# Patient Record
Sex: MALE | Race: BLACK OR AFRICAN AMERICAN | NOT HISPANIC OR LATINO | Employment: FULL TIME | URBAN - METROPOLITAN AREA
[De-identification: names, ages, dates, MRNs, and addresses within clinical notes are randomized per-mention and may not be internally consistent; named-entity substitution may affect disease eponyms.]

---

## 2017-02-08 ENCOUNTER — TELEPHONE (OUTPATIENT)
Dept: INTERNAL MEDICINE | Facility: CLINIC | Age: 52
End: 2017-02-08

## 2017-02-08 NOTE — TELEPHONE ENCOUNTER
----- Message from Zully Green sent at 2017  2:34 PM EST -----  Contact: pt - Dr lacy' pt - RE: new Rx refill  Pt calling and would like a refill on Rx      Stendra 100 mg      Si(one) tab 30 min before sex.  #6 30 days    [facility] - [address]  # [phone]        Pt # [phone]

## 2017-02-09 DIAGNOSIS — N52.8 OTHER MALE ERECTILE DYSFUNCTION: Primary | ICD-10-CM

## 2017-03-01 RX ORDER — LISINOPRIL AND HYDROCHLOROTHIAZIDE 20; 12.5 MG/1; MG/1
1 TABLET ORAL DAILY
Qty: 90 TABLET | Refills: 3 | Status: SHIPPED | OUTPATIENT
Start: 2017-03-01 | End: 2018-02-22 | Stop reason: SDUPTHER

## 2017-03-01 NOTE — TELEPHONE ENCOUNTER
----- Message from Nicky Jiang sent at 3/1/2017 12:18 PM EST -----  Contact: pt  Pt calling and would like a refill on RX sent into Pharmacy. Please advise.     Lisinopril-Hydroclorothiazide 20-12.5 mg, 90 days     Send to :  [facility] [address] [place] [address] [id number] AT [facility] [address] - [phone]  - [phone] FX    Pt# [phone]

## 2018-02-22 DIAGNOSIS — N52.8 OTHER MALE ERECTILE DYSFUNCTION: Primary | ICD-10-CM

## 2018-02-22 RX ORDER — LISINOPRIL AND HYDROCHLOROTHIAZIDE 20; 12.5 MG/1; MG/1
1 TABLET ORAL DAILY
Qty: 30 TABLET | Refills: 0 | Status: SHIPPED | OUTPATIENT
Start: 2018-02-22 | End: 2018-04-05 | Stop reason: SDUPTHER

## 2018-02-26 DIAGNOSIS — N52.8 OTHER MALE ERECTILE DYSFUNCTION: ICD-10-CM

## 2018-02-26 RX ORDER — AVANAFIL 100 MG/1
TABLET ORAL
Qty: 10 TABLET | Refills: 0 | Status: SHIPPED | OUTPATIENT
Start: 2018-02-26 | End: 2018-06-28 | Stop reason: SDUPTHER

## 2018-04-05 DIAGNOSIS — N52.8 OTHER MALE ERECTILE DYSFUNCTION: ICD-10-CM

## 2018-04-05 RX ORDER — LISINOPRIL AND HYDROCHLOROTHIAZIDE 20; 12.5 MG/1; MG/1
1 TABLET ORAL DAILY
Qty: 30 TABLET | Refills: 0 | Status: SHIPPED | OUTPATIENT
Start: 2018-04-05 | End: 2018-04-09 | Stop reason: SDUPTHER

## 2018-04-09 ENCOUNTER — OFFICE VISIT (OUTPATIENT)
Dept: INTERNAL MEDICINE | Facility: CLINIC | Age: 53
End: 2018-04-09

## 2018-04-09 VITALS
BODY MASS INDEX: 27.13 KG/M2 | HEIGHT: 68 IN | DIASTOLIC BLOOD PRESSURE: 100 MMHG | SYSTOLIC BLOOD PRESSURE: 142 MMHG | WEIGHT: 179 LBS

## 2018-04-09 DIAGNOSIS — I10 ESSENTIAL HYPERTENSION: ICD-10-CM

## 2018-04-09 DIAGNOSIS — Z12.5 PROSTATE CANCER SCREENING: Primary | ICD-10-CM

## 2018-04-09 DIAGNOSIS — Z00.00 ANNUAL PHYSICAL EXAM: ICD-10-CM

## 2018-04-09 LAB
ALBUMIN SERPL-MCNC: 4.1 G/DL (ref 3.5–5.2)
ALBUMIN/GLOB SERPL: 1.2 G/DL
ALP SERPL-CCNC: 75 U/L (ref 39–117)
ALT SERPL W P-5'-P-CCNC: 21 U/L (ref 1–41)
ANION GAP SERPL CALCULATED.3IONS-SCNC: 11.2 MMOL/L
AST SERPL-CCNC: 21 U/L (ref 1–40)
BASOPHILS # BLD AUTO: 0.02 10*3/MM3 (ref 0–0.2)
BASOPHILS NFR BLD AUTO: 0.3 % (ref 0–2)
BILIRUB SERPL-MCNC: 0.7 MG/DL (ref 0.1–1.2)
BUN BLD-MCNC: 19 MG/DL (ref 6–20)
BUN/CREAT SERPL: 14.7 (ref 7–25)
CALCIUM SPEC-SCNC: 9.5 MG/DL (ref 8.6–10.5)
CHLORIDE SERPL-SCNC: 99 MMOL/L (ref 98–107)
CHOLEST SERPL-MCNC: 196 MG/DL (ref 0–200)
CO2 SERPL-SCNC: 29.8 MMOL/L (ref 22–29)
CREAT BLD-MCNC: 1.29 MG/DL (ref 0.76–1.27)
DEPRECATED RDW RBC AUTO: 41.6 FL (ref 37–54)
EOSINOPHIL # BLD AUTO: 0.1 10*3/MM3 (ref 0–0.7)
EOSINOPHIL NFR BLD AUTO: 1.6 % (ref 0–5)
ERYTHROCYTE [DISTWIDTH] IN BLOOD BY AUTOMATED COUNT: 13.4 % (ref 11.5–15)
GFR SERPL CREATININE-BSD FRML MDRD: 58 ML/MIN/1.73
GLOBULIN UR ELPH-MCNC: 3.4 GM/DL
GLUCOSE BLD-MCNC: 98 MG/DL (ref 65–99)
HCT VFR BLD AUTO: 44.6 % (ref 40.1–51)
HDLC SERPL-MCNC: 55 MG/DL (ref 40–60)
HGB BLD-MCNC: 15.7 G/DL (ref 13.7–17.5)
LDLC SERPL CALC-MCNC: 121 MG/DL (ref 0–100)
LDLC/HDLC SERPL: 2.19 {RATIO}
LYMPHOCYTES # BLD AUTO: 2.36 10*3/MM3 (ref 0.8–7)
LYMPHOCYTES NFR BLD AUTO: 37.9 % (ref 10–60)
MCH RBC QN AUTO: 30.7 PG (ref 26–34)
MCHC RBC AUTO-ENTMCNC: 35.2 G/DL (ref 31–37)
MCV RBC AUTO: 87.1 FL (ref 80–100)
MONOCYTES # BLD AUTO: 0.77 10*3/MM3 (ref 0–1)
MONOCYTES NFR BLD AUTO: 12.4 % (ref 0–13)
NEUTROPHILS # BLD AUTO: 2.97 10*3/MM3 (ref 1–11)
NEUTROPHILS NFR BLD AUTO: 47.8 % (ref 30–85)
PLATELET # BLD AUTO: 227 10*3/MM3 (ref 150–450)
PMV BLD AUTO: 10.3 FL (ref 6–12)
POTASSIUM BLD-SCNC: 3.9 MMOL/L (ref 3.5–5.2)
PROT SERPL-MCNC: 7.5 G/DL (ref 6–8.5)
PSA SERPL-MCNC: 4.71 NG/ML (ref 0–4)
RBC # BLD AUTO: 5.12 10*6/MM3 (ref 4.63–6.08)
SODIUM BLD-SCNC: 140 MMOL/L (ref 136–145)
TRIGL SERPL-MCNC: 102 MG/DL (ref 0–150)
VLDLC SERPL-MCNC: 20.4 MG/DL (ref 5–40)
WBC NRBC COR # BLD: 6.22 10*3/MM3 (ref 5–10)

## 2018-04-09 PROCEDURE — 80053 COMPREHEN METABOLIC PANEL: CPT | Performed by: INTERNAL MEDICINE

## 2018-04-09 PROCEDURE — 80061 LIPID PANEL: CPT | Performed by: INTERNAL MEDICINE

## 2018-04-09 PROCEDURE — 99396 PREV VISIT EST AGE 40-64: CPT | Performed by: INTERNAL MEDICINE

## 2018-04-09 PROCEDURE — 85025 COMPLETE CBC W/AUTO DIFF WBC: CPT | Performed by: INTERNAL MEDICINE

## 2018-04-09 RX ORDER — LISINOPRIL 2.5 MG/1
2.5 TABLET ORAL
COMMUNITY
End: 2019-05-09

## 2018-04-09 NOTE — PROGRESS NOTES
Subjective   Aldair Wesley is a 53 y.o. male.     Hypertension   This is a chronic problem. The current episode started more than 1 year ago. Pertinent negatives include no chest pain or palpitations.        The following portions of the patient's history were reviewed and updated as appropriate: allergies, current medications, past family history, past medical history, past social history, past surgical history and problem list.    Review of Systems   Constitutional:        Here for CPE Former pt of Dr Benjamin   HENT: Negative.    Eyes: Negative.    Respiratory: Negative.    Cardiovascular: Negative for chest pain and palpitations.        BP well controlled on lisinopril   Gastrointestinal: Negative.         Due for Cscope   Genitourinary: Negative.    Musculoskeletal: Negative.    Neurological: Negative.    Psychiatric/Behavioral: Negative.        Objective   Physical Exam   Constitutional: He is oriented to person, place, and time. He appears well-developed and well-nourished.   HENT:   Head: Normocephalic.   Right Ear: External ear normal.   Left Ear: External ear normal.   Nose: Nose normal.   Mouth/Throat: Oropharynx is clear and moist.   Eyes: Conjunctivae and EOM are normal. Pupils are equal, round, and reactive to light.   Neck: Normal range of motion. Neck supple.   Cardiovascular: Normal rate, regular rhythm, normal heart sounds and intact distal pulses.    Repeat 140/90   Pulmonary/Chest: Effort normal and breath sounds normal.   Abdominal: Soft. Bowel sounds are normal. He exhibits no mass. There is no tenderness.   Genitourinary: Rectum normal, prostate normal and penis normal.   Musculoskeletal: Normal range of motion.   Lymphadenopathy:     He has no cervical adenopathy.   Neurological: He is alert and oriented to person, place, and time. He has normal reflexes.   Skin: Skin is warm and dry.   Psychiatric: He has a normal mood and affect. His behavior is normal.       Assessment/Plan   Aldair was  seen today for annual exam.    Diagnoses and all orders for this visit:    Prostate cancer screening  -     PSA SCREENING; Future  -     PSA SCREENING    Annual physical exam  -     CBC Auto Differential; Future  -     Comprehensive Metabolic Panel; Future  -     CBC Auto Differential  -     Comprehensive Metabolic Panel    Essential hypertension  -     Lipid Panel; Future  -     XR Chest PA & Lateral; Future  -     Lipid Panel

## 2018-05-22 ENCOUNTER — TELEPHONE (OUTPATIENT)
Dept: INTERNAL MEDICINE | Facility: CLINIC | Age: 53
End: 2018-05-22

## 2018-05-22 NOTE — TELEPHONE ENCOUNTER
----- Message from Zully Green sent at 5/22/2018  2:46 PM EDT -----  Contact: pt - Dr Mercado's pt - RE: Labs results  Pt calling and would like a call back regarding lab results as pt received his labs in the mail stating :    PSA is slightly elevated.  Other labs are good.  Please call to discuss    Pt # [phone]

## 2018-05-22 NOTE — TELEPHONE ENCOUNTER
Spoke with patient.  I will repeat PSA and rectal exam in about 3 months and schedule him for colonoscopy at that time as well.

## 2018-05-29 PROCEDURE — 71046 X-RAY EXAM CHEST 2 VIEWS: CPT | Performed by: RADIOLOGY

## 2018-06-28 DIAGNOSIS — N52.8 OTHER MALE ERECTILE DYSFUNCTION: ICD-10-CM

## 2018-06-28 RX ORDER — AVANAFIL 100 MG/1
TABLET ORAL
Qty: 10 TABLET | Refills: 0 | Status: SHIPPED | OUTPATIENT
Start: 2018-06-28 | End: 2021-05-28

## 2018-08-18 DIAGNOSIS — N52.8 OTHER MALE ERECTILE DYSFUNCTION: ICD-10-CM

## 2018-08-20 RX ORDER — LISINOPRIL AND HYDROCHLOROTHIAZIDE 20; 12.5 MG/1; MG/1
1 TABLET ORAL DAILY
Qty: 30 TABLET | Refills: 0 | Status: SHIPPED | OUTPATIENT
Start: 2018-08-20 | End: 2019-05-09

## 2018-08-26 DIAGNOSIS — N52.8 OTHER MALE ERECTILE DYSFUNCTION: ICD-10-CM

## 2018-08-27 RX ORDER — LISINOPRIL AND HYDROCHLOROTHIAZIDE 20; 12.5 MG/1; MG/1
1 TABLET ORAL DAILY
Qty: 30 TABLET | Refills: 3 | Status: SHIPPED | OUTPATIENT
Start: 2018-08-27 | End: 2019-05-09

## 2019-04-30 ENCOUNTER — TELEPHONE (OUTPATIENT)
Dept: INTERNAL MEDICINE | Facility: CLINIC | Age: 54
End: 2019-04-30

## 2019-04-30 NOTE — TELEPHONE ENCOUNTER
----- Message from Keri Haley sent at 4/29/2019  3:45 PM EDT -----  Contact: Pt   Pt is scheduled to see Donna on 5-9-2019. He is requesting an order for his first screening colonoscopy. He stated he does not have a specific doctor in mind. Please advise. Thank you.

## 2019-04-30 NOTE — TELEPHONE ENCOUNTER
Pt informed that we will discuss that more in depth when we see him her on 05/09. He agreed and understood.

## 2019-05-09 ENCOUNTER — OFFICE VISIT (OUTPATIENT)
Dept: INTERNAL MEDICINE | Facility: CLINIC | Age: 54
End: 2019-05-09

## 2019-05-09 VITALS
OXYGEN SATURATION: 97 % | BODY MASS INDEX: 28.25 KG/M2 | HEART RATE: 69 BPM | WEIGHT: 180 LBS | SYSTOLIC BLOOD PRESSURE: 118 MMHG | HEIGHT: 67 IN | DIASTOLIC BLOOD PRESSURE: 78 MMHG

## 2019-05-09 DIAGNOSIS — M72.2 PLANTAR FASCIITIS, BILATERAL: ICD-10-CM

## 2019-05-09 DIAGNOSIS — I10 ESSENTIAL HYPERTENSION: ICD-10-CM

## 2019-05-09 DIAGNOSIS — Z00.00 ANNUAL PHYSICAL EXAM: Primary | ICD-10-CM

## 2019-05-09 DIAGNOSIS — Z12.11 COLON CANCER SCREENING: ICD-10-CM

## 2019-05-09 DIAGNOSIS — Z11.59 SCREENING FOR VIRAL DISEASE: ICD-10-CM

## 2019-05-09 DIAGNOSIS — Z12.5 PROSTATE CANCER SCREENING: ICD-10-CM

## 2019-05-09 LAB
ALBUMIN SERPL-MCNC: 4.3 G/DL (ref 3.5–5.2)
ALBUMIN/GLOB SERPL: 1.3 G/DL
ALP SERPL-CCNC: 71 U/L (ref 39–117)
ALT SERPL W P-5'-P-CCNC: 17 U/L (ref 1–41)
ANION GAP SERPL CALCULATED.3IONS-SCNC: 11.9 MMOL/L
AST SERPL-CCNC: 18 U/L (ref 1–40)
BILIRUB SERPL-MCNC: 0.6 MG/DL (ref 0.2–1.2)
BUN BLD-MCNC: 16 MG/DL (ref 6–20)
BUN/CREAT SERPL: 12.5 (ref 7–25)
CALCIUM SPEC-SCNC: 9.3 MG/DL (ref 8.6–10.5)
CHLORIDE SERPL-SCNC: 101 MMOL/L (ref 98–107)
CHOLEST SERPL-MCNC: 189 MG/DL (ref 0–200)
CO2 SERPL-SCNC: 26.1 MMOL/L (ref 22–29)
CREAT BLD-MCNC: 1.28 MG/DL (ref 0.76–1.27)
DEPRECATED RDW RBC AUTO: 42.2 FL (ref 37–54)
ERYTHROCYTE [DISTWIDTH] IN BLOOD BY AUTOMATED COUNT: 13.4 % (ref 12.3–15.4)
GFR SERPL CREATININE-BSD FRML MDRD: 59 ML/MIN/1.73
GLOBULIN UR ELPH-MCNC: 3.3 GM/DL
GLUCOSE BLD-MCNC: 97 MG/DL (ref 65–99)
HCT VFR BLD AUTO: 44.5 % (ref 37.5–51)
HDLC SERPL-MCNC: 47 MG/DL (ref 40–60)
HGB BLD-MCNC: 15.5 G/DL (ref 13–17.7)
LDLC SERPL CALC-MCNC: 128 MG/DL (ref 0–100)
LDLC/HDLC SERPL: 2.73 {RATIO}
MCH RBC QN AUTO: 30.8 PG (ref 26.6–33)
MCHC RBC AUTO-ENTMCNC: 34.8 G/DL (ref 31.5–35.7)
MCV RBC AUTO: 88.3 FL (ref 79–97)
PLATELET # BLD AUTO: 250 10*3/MM3 (ref 140–450)
PMV BLD AUTO: 10.4 FL (ref 6–12)
POTASSIUM BLD-SCNC: 4.3 MMOL/L (ref 3.5–5.2)
PROT SERPL-MCNC: 7.6 G/DL (ref 6–8.5)
PSA SERPL-MCNC: 4.79 NG/ML (ref 0–4)
RBC # BLD AUTO: 5.04 10*6/MM3 (ref 4.14–5.8)
SODIUM BLD-SCNC: 139 MMOL/L (ref 136–145)
TRIGL SERPL-MCNC: 69 MG/DL (ref 0–150)
VLDLC SERPL-MCNC: 13.8 MG/DL (ref 5–40)
WBC NRBC COR # BLD: 5.72 10*3/MM3 (ref 3.4–10.8)

## 2019-05-09 PROCEDURE — 80053 COMPREHEN METABOLIC PANEL: CPT | Performed by: NURSE PRACTITIONER

## 2019-05-09 PROCEDURE — 99396 PREV VISIT EST AGE 40-64: CPT | Performed by: NURSE PRACTITIONER

## 2019-05-09 PROCEDURE — 80061 LIPID PANEL: CPT | Performed by: NURSE PRACTITIONER

## 2019-05-09 PROCEDURE — 85027 COMPLETE CBC AUTOMATED: CPT | Performed by: NURSE PRACTITIONER

## 2019-05-09 RX ORDER — LISINOPRIL 2.5 MG/1
2.5 TABLET ORAL DAILY
Qty: 30 TABLET | Refills: 5 | Status: SHIPPED | OUTPATIENT
Start: 2019-05-09 | End: 2019-06-13 | Stop reason: SDUPTHER

## 2019-05-09 RX ORDER — LISINOPRIL 2.5 MG/1
2.5 TABLET ORAL DAILY
COMMUNITY
End: 2019-05-09 | Stop reason: SDUPTHER

## 2019-05-09 NOTE — PROGRESS NOTES
Subjective   Aldair Wesley is a 54 y.o. male.     .Well Adult Physical   Patient here for a comprehensive physical exam.The patient reports no problems    Do you take any herbs or supplements that were not prescribed by a doctor? no   Are you taking calcium supplements? no   Are you taking aspirin daily? no     History:  Patient receives prostate care outside our clinic  Date last prostate exam: 4/2018  Date last PSA: 4/2018    He works full time (). He is exercising maybe twice week. He eats good diet. He is up to date with eye exam .          The following portions of the patient's history were reviewed and updated as appropriate: allergies, current medications, past family history, past medical history, past social history, past surgical history and problem list.    Review of Systems   Constitutional: Negative for activity change, appetite change, chills, fatigue, fever and unexpected weight change.        Overall doing well    HENT: Negative for congestion, ear discharge, ear pain, hearing loss, mouth sores, nosebleeds, postnasal drip, rhinorrhea, sinus pressure, sneezing, sore throat, tinnitus and voice change.    Eyes: Negative for visual disturbance.   Respiratory: Negative for cough, chest tightness, shortness of breath and wheezing.    Cardiovascular: Positive for palpitations (went to [facility] about one month ago and ECG ok. no other problems ). Negative for chest pain and leg swelling.   Gastrointestinal: Negative for abdominal distention, abdominal pain, anal bleeding, blood in stool, constipation, diarrhea, nausea and vomiting.   Endocrine: Negative for cold intolerance, heat intolerance, polydipsia, polyphagia and polyuria.   Genitourinary: Negative for difficulty urinating, discharge, frequency, hematuria, penile pain, penile swelling, scrotal swelling, testicular pain and urgency.   Musculoskeletal: Negative for arthralgias, back pain, gait problem, joint swelling, myalgias, neck pain and  neck stiffness.        Bilateral foot pain, worst right foot, started few months ago, aching, worst with standing   Skin: Negative for color change, pallor and rash.        NEGATIVE BREAST MASS, BREAST PAIN, NIPPLE DISCHARGE, SKIN CHANGES   Neurological: Negative for dizziness, tremors, seizures, syncope, speech difficulty, weakness, light-headedness, numbness and headaches.   Hematological: Negative for adenopathy. Does not bruise/bleed easily.   Psychiatric/Behavioral: Negative for confusion, decreased concentration, dysphoric mood, sleep disturbance and suicidal ideas. The patient is not nervous/anxious.        Objective   Physical Exam   Constitutional: He is oriented to person, place, and time. He appears well-developed and well-nourished. No distress.   HENT:   Head: Normocephalic and atraumatic.   Right Ear: External ear normal.   Left Ear: External ear normal.   Nose: Nose normal.   Mouth/Throat: Oropharynx is clear and moist.   Eyes: Conjunctivae and EOM are normal. Pupils are equal, round, and reactive to light. Right eye exhibits no discharge. Left eye exhibits no discharge. No scleral icterus.   Neck: Normal range of motion. Neck supple. No JVD present. Carotid bruit is not present. No tracheal deviation present. No thyroid mass and no thyromegaly present.   Cardiovascular: Normal rate, regular rhythm, normal heart sounds and intact distal pulses. Exam reveals no gallop and no friction rub.   No murmur heard.  Repeat bp left arm 110/78  No pedal edema    Pulmonary/Chest: Effort normal and breath sounds normal. No respiratory distress. He has no wheezes. He has no rales. He exhibits no tenderness.   Abdominal: Soft. Bowel sounds are normal. He exhibits no distension and no mass. There is no tenderness. There is no rebound and no guarding. No hernia.   Genitourinary:   Genitourinary Comments: He decline hernia/prostate/rectal exam    Musculoskeletal: Normal range of motion. He exhibits no edema.   (-) SLR     Lymphadenopathy:        Head (right side): No submental, no submandibular, no tonsillar, no preauricular, no posterior auricular and no occipital adenopathy present.        Head (left side): No submental, no submandibular, no tonsillar, no preauricular, no posterior auricular and no occipital adenopathy present.     He has no cervical adenopathy.   Neurological: He is alert and oriented to person, place, and time. He has normal reflexes. No cranial nerve deficit. He exhibits normal muscle tone. Coordination normal.   Reflex Scores:       Brachioradialis reflexes are 2+ on the right side and 2+ on the left side.       Patellar reflexes are 2+ on the right side and 2+ on the left side.  Skin: Skin is warm and dry. No rash noted. No erythema.   Psychiatric: He has a normal mood and affect. His speech is normal and behavior is normal. Judgment and thought content normal. Cognition and memory are normal.   Vitals reviewed.      Assessment/Plan   Aldair was seen today for annual exam.    Diagnoses and all orders for this visit:    Annual physical exam  -     Comprehensive Metabolic Panel; Future  -     Lipid Panel; Future  -     CBC No Differential; Future  -     TSH Rfx On Abnormal To Free T4; Future  -     Comprehensive Metabolic Panel  -     Lipid Panel  -     CBC No Differential  -     TSH Rfx On Abnormal To Free T4    Essential hypertension  -     lisinopril (PRINIVIL,ZESTRIL) 2.5 MG tablet; Take 1 tablet by mouth Daily.    Prostate cancer screening  -     PSA SCREENING; Future  -     PSA SCREENING    Screening for viral disease  -     Hepatitis C Antibody; Future  -     Hepatitis C Antibody    Plantar fasciitis, bilateral  Comments:  wear supportive shoes     Colon cancer screening  -     Ambulatory Referral to General Surgery        This patient is new to me previous patient of Dr Mercado.   He declined TDAP today.   He was advised to shingrix  (check with insurance regarding coverage).

## 2019-05-09 NOTE — PATIENT INSTRUCTIONS
Health Maintenance, Male  A healthy lifestyle and preventive care is important for your health and wellness. Ask your health care provider about what schedule of regular examinations is right for you.  What should I know about weight and diet?  Eat a Healthy Diet  · Eat plenty of vegetables, fruits, whole grains, low-fat dairy products, and lean protein.  · Do not eat a lot of foods high in solid fats, added sugars, or salt.    Maintain a Healthy Weight  Regular exercise can help you achieve or maintain a healthy weight. You should:  · Do at least 150 minutes of exercise each week. The exercise should increase your heart rate and make you sweat (moderate-intensity exercise).  · Do strength-training exercises at least twice a week.    Watch Your Levels of Cholesterol and Blood Lipids  · Have your blood tested for lipids and cholesterol every 5 years starting at 35 years of age. If you are at high risk for heart disease, you should start having your blood tested when you are 20 years old. You may need to have your cholesterol levels checked more often if:  ? Your lipid or cholesterol levels are high.  ? You are older than 50 years of age.  ? You are at high risk for heart disease.    What should I know about cancer screening?  Many types of cancers can be detected early and may often be prevented.  Lung Cancer  · You should be screened every year for lung cancer if:  ? You are a current smoker who has smoked for at least 30 years.  ? You are a former smoker who has quit within the past 15 years.  · Talk to your health care provider about your screening options, when you should start screening, and how often you should be screened.    Colorectal Cancer  · Routine colorectal cancer screening usually begins at 50 years of age and should be repeated every 5-10 years until you are 75 years old. You may need to be screened more often if early forms of precancerous polyps or small growths are found. Your health care provider  may recommend screening at an earlier age if you have risk factors for colon cancer.  · Your health care provider may recommend using home test kits to check for hidden blood in the stool.  · A small camera at the end of a tube can be used to examine your colon (sigmoidoscopy or colonoscopy). This checks for the earliest forms of colorectal cancer.    Prostate and Testicular Cancer  · Depending on your age and overall health, your health care provider may do certain tests to screen for prostate and testicular cancer.  · Talk to your health care provider about any symptoms or concerns you have about testicular or prostate cancer.    Skin Cancer  · Check your skin from head to toe regularly.  · Tell your health care provider about any new moles or changes in moles, especially if:  ? There is a change in a mole’s size, shape, or color.  ? You have a mole that is larger than a pencil eraser.  · Always use sunscreen. Apply sunscreen liberally and repeat throughout the day.  · Protect yourself by wearing long sleeves, pants, a wide-brimmed hat, and sunglasses when outside.    What should I know about heart disease, diabetes, and high blood pressure?  · If you are 18-39 years of age, have your blood pressure checked every 3-5 years. If you are 40 years of age or older, have your blood pressure checked every year. You should have your blood pressure measured twice--once when you are at a hospital or clinic, and once when you are not at a hospital or clinic. Record the average of the two measurements. To check your blood pressure when you are not at a hospital or clinic, you can use:  ? An automated blood pressure machine at a pharmacy.  ? A home blood pressure monitor.  · Talk to your health care provider about your target blood pressure.  · If you are between 45-79 years old, ask your health care provider if you should take aspirin to prevent heart disease.  · Have regular diabetes screenings by checking your fasting blood  sugar level.  ? If you are at a normal weight and have a low risk for diabetes, have this test once every three years after the age of 45.  ? If you are overweight and have a high risk for diabetes, consider being tested at a younger age or more often.  · A one-time screening for abdominal aortic aneurysm (AAA) by ultrasound is recommended for men aged 65-75 years who are current or former smokers.  What should I know about preventing infection?  Hepatitis B  If you have a higher risk for hepatitis B, you should be screened for this virus. Talk with your health care provider to find out if you are at risk for hepatitis B infection.  Hepatitis C  Blood testing is recommended for:  · Everyone born from 1945 through 1965.  · Anyone with known risk factors for hepatitis C.    Sexually Transmitted Diseases (STDs)  · You should be screened each year for STDs including gonorrhea and chlamydia if:  ? You are sexually active and are younger than 24 years of age.  ? You are older than 24 years of age and your health care provider tells you that you are at risk for this type of infection.  ? Your sexual activity has changed since you were last screened and you are at an increased risk for chlamydia or gonorrhea. Ask your health care provider if you are at risk.  · Talk with your health care provider about whether you are at high risk of being infected with HIV. Your health care provider may recommend a prescription medicine to help prevent HIV infection.    What else can I do?  · Schedule regular health, dental, and eye exams.  · Stay current with your vaccines (immunizations).  · Do not use any tobacco products, such as cigarettes, chewing tobacco, and e-cigarettes. If you need help quitting, ask your health care provider.  · Limit alcohol intake to no more than 2 drinks per day. One drink equals 12 ounces of beer, 5 ounces of wine, or 1½ ounces of hard liquor.  · Do not use street drugs.  · Do not share needles.  · Ask your  health care provider for help if you need support or information about quitting drugs.  · Tell your health care provider if you often feel depressed.  · Tell your health care provider if you have ever been abused or do not feel safe at home.  This information is not intended to replace advice given to you by your health care provider. Make sure you discuss any questions you have with your health care provider.  Document Released: 06/15/2009 Document Revised: 08/16/2017 Document Reviewed: 09/20/2016  [employer] Interactive Patient Education © 2019 [employer]    Plantar Fasciitis  Plantar fasciitis is a painful foot condition that affects the heel. It occurs when the band of tissue that connects the toes to the heel bone (plantar fascia) becomes irritated. This can happen after exercising too much or doing other repetitive activities (overuse injury). The pain from plantar fasciitis can range from mild irritation to severe pain that makes it difficult for you to walk or move. The pain is usually worse in the morning or after you have been sitting or lying down for a while.  What are the causes?  This condition may be caused by:  · Standing for long periods of time.  · Wearing shoes that do not fit.  · Doing high-impact activities, including running, aerobics, and ballet.  · Being overweight.  · Having an abnormal way of walking (gait).  · Having tight calf muscles.  · Having high arches in your feet.  · Starting a new athletic activity.    What are the signs or symptoms?  The main symptom of this condition is heel pain. Other symptoms include:  · Pain that gets worse after activity or exercise.  · Pain that is worse in the morning or after resting.  · Pain that goes away after you walk for a few minutes.    How is this diagnosed?  This condition may be diagnosed based on your signs and symptoms. Your health care provider will also do a physical exam to check for:  · A tender area on the bottom of your foot.  · A high  arch in your foot.  · Pain when you move your foot.  · Difficulty moving your foot.    You may also need to have imaging studies to confirm the diagnosis. These can include:  · X-rays.  · Ultrasound.  · MRI.    How is this treated?  Treatment for plantar fasciitis depends on the severity of the condition. Your treatment may include:  · Rest, ice, and over-the-counter pain medicines to manage your pain.  · Exercises to stretch your calves and your plantar fascia.  · A splint that holds your foot in a stretched, upward position while you sleep (night splint).  · Physical therapy to relieve symptoms and prevent problems in the future.  · Cortisone injections to relieve severe pain.  · Extracorporeal shock wave therapy (ESWT) to stimulate damaged plantar fascia with electrical impulses. It is often used as a last resort before surgery.  · Surgery, if other treatments have not worked after 12 months.    Follow these instructions at home:  · Take medicines only as directed by your health care provider.  · Avoid activities that cause pain.  · Roll the bottom of your foot over a bag of ice or a bottle of cold water. Do this for 20 minutes, 3-4 times a day.  · Perform simple stretches as directed by your health care provider.  · Try wearing athletic shoes with air-sole or gel-sole cushions or soft shoe inserts.  · Wear a night splint while sleeping, if directed by your health care provider.  · Keep all follow-up appointments with your health care provider.  How is this prevented?  · Do not perform exercises or activities that cause heel pain.  · Consider finding low-impact activities if you continue to have problems.  · Lose weight if you need to.  The best way to prevent plantar fasciitis is to avoid the activities that aggravate your plantar fascia.  Contact a health care provider if:  · Your symptoms do not go away after treatment with home care measures.  · Your pain gets worse.  · Your pain affects your ability to move  or do your daily activities.  This information is not intended to replace advice given to you by your health care provider. Make sure you discuss any questions you have with your health care provider.  Document Released: 09/12/2002 Document Revised: 05/22/2017 Document Reviewed: 10/28/2015  [employer] Interactive Patient Education © 2018 [employer]

## 2019-05-10 LAB
HCV AB S/CO SERPL IA: <0.1 S/CO RATIO (ref 0–0.9)
TSH SERPL-ACNC: 0.99 MIU/ML (ref 0.27–4.2)

## 2019-05-16 ENCOUNTER — TELEPHONE (OUTPATIENT)
Dept: INTERNAL MEDICINE | Facility: CLINIC | Age: 54
End: 2019-05-16

## 2019-05-16 DIAGNOSIS — R97.20 ELEVATED PSA: Primary | ICD-10-CM

## 2019-05-16 NOTE — TELEPHONE ENCOUNTER
I called patient to discuss abnormal PSA levels. There was no answer so I left a message for patient to return call.

## 2019-05-16 NOTE — TELEPHONE ENCOUNTER
I returned call to patient and inform him due to persist elevated PSA I recommend urologist referral. He was agreeable. Will place order.

## 2019-05-16 NOTE — TELEPHONE ENCOUNTER
----- Message from Zully Green sent at 5/16/2019 12:42 PM EDT -----  Contact: pt - Donna's pt - RE: returning call  Pt calling and would like a return call. He informs returning Donna's call.        Pt # [phone]

## 2019-05-24 ENCOUNTER — PREP FOR SURGERY (OUTPATIENT)
Dept: OTHER | Facility: HOSPITAL | Age: 54
End: 2019-05-24

## 2019-05-24 DIAGNOSIS — Z12.11 ENCOUNTER FOR SCREENING COLONOSCOPY: Primary | ICD-10-CM

## 2019-05-29 PROBLEM — Z12.11 ENCOUNTER FOR SCREENING COLONOSCOPY: Status: ACTIVE | Noted: 2019-05-29

## 2019-06-11 ENCOUNTER — TELEPHONE (OUTPATIENT)
Dept: INTERNAL MEDICINE | Facility: CLINIC | Age: 54
End: 2019-06-11

## 2019-06-11 NOTE — TELEPHONE ENCOUNTER
----- Message from Emily Marion sent at 6/11/2019 11:23 AM EDT -----  Contact: Patient   Patient calling c/o BP consistently running in 140s/90s.  When he saw urologist last week it was 180/104, but states he was nervous.  He has no associated symptoms.  He is asking if Donna wants to increase medication to 5 mg.    lisinopril (PRINIVIL,ZESTRIL) 2.5 MG tablet    Patient also wants to know if he should take a baby aspirin a day.    Patient:  [phone]    [facility] [employer] [address] [place] [address] [id number] AT [facility] [address] - [phone]  - [phone] FX

## 2019-06-11 NOTE — TELEPHONE ENCOUNTER
Pt needs to use his 2.5 mg tablets first-take 2 daily.  When he calls back in two weeks, depending on BP, Donna may need to change dosage.

## 2019-06-11 NOTE — TELEPHONE ENCOUNTER
Ok to increase lisinopril 5 mg daily (ok to use current prescription-take 2 tablets daily). Please check daily and call with readings in 2 weeks. The goal is less than 140/90, No aspirin needed. Thanks

## 2019-06-13 ENCOUNTER — OFFICE VISIT (OUTPATIENT)
Dept: INTERNAL MEDICINE | Facility: CLINIC | Age: 54
End: 2019-06-13

## 2019-06-13 VITALS
OXYGEN SATURATION: 99 % | WEIGHT: 179.8 LBS | HEART RATE: 67 BPM | HEIGHT: 67 IN | DIASTOLIC BLOOD PRESSURE: 96 MMHG | BODY MASS INDEX: 28.22 KG/M2 | SYSTOLIC BLOOD PRESSURE: 144 MMHG

## 2019-06-13 DIAGNOSIS — R97.20 ELEVATED PSA: ICD-10-CM

## 2019-06-13 DIAGNOSIS — I10 ESSENTIAL HYPERTENSION: Primary | ICD-10-CM

## 2019-06-13 PROCEDURE — 99213 OFFICE O/P EST LOW 20 MIN: CPT | Performed by: NURSE PRACTITIONER

## 2019-06-13 RX ORDER — LISINOPRIL 10 MG/1
10 TABLET ORAL DAILY
Qty: 30 TABLET | Refills: 1 | Status: SHIPPED | OUTPATIENT
Start: 2019-06-13 | End: 2019-08-30 | Stop reason: SDUPTHER

## 2019-06-13 NOTE — PATIENT INSTRUCTIONS
"DASH Eating Plan  DASH stands for \"Dietary Approaches to Stop Hypertension.\" The DASH eating plan is a healthy eating plan that has been shown to reduce high blood pressure (hypertension). It may also reduce your risk for type 2 diabetes, heart disease, and stroke. The DASH eating plan may also help with weight loss.  What are tips for following this plan?  General guidelines  · Avoid eating more than 2,300 mg (milligrams) of salt (sodium) a day. If you have hypertension, you may need to reduce your sodium intake to 1,500 mg a day.  · Limit alcohol intake to no more than 1 drink a day for nonpregnant women and 2 drinks a day for men. One drink equals 12 oz of beer, 5 oz of wine, or 1½ oz of hard liquor.  · Work with your health care provider to maintain a healthy body weight or to lose weight. Ask what an ideal weight is for you.  · Get at least 30 minutes of exercise that causes your heart to beat faster (aerobic exercise) most days of the week. Activities may include walking, swimming, or biking.  · Work with your health care provider or diet and nutrition specialist (dietitian) to adjust your eating plan to your individual calorie needs.  Reading food labels  · Check food labels for the amount of sodium per serving. Choose foods with less than 5 percent of the Daily Value of sodium. Generally, foods with less than 300 mg of sodium per serving fit into this eating plan.  · To find whole grains, look for the word \"whole\" as the first word in the ingredient list.  Shopping  · Buy products labeled as \"low-sodium\" or \"no salt added.\"  · Buy fresh foods. Avoid canned foods and premade or frozen meals.  Cooking  · Avoid adding salt when cooking. Use salt-free seasonings or herbs instead of table salt or sea salt. Check with your health care provider or pharmacist before using salt substitutes.  · Do not estes foods. Cook foods using healthy methods such as baking, boiling, grilling, and broiling instead.  · Cook with " heart-healthy oils, such as olive, canola, soybean, or sunflower oil.  Meal planning    · Eat a balanced diet that includes:  ? 5 or more servings of fruits and vegetables each day. At each meal, try to fill half of your plate with fruits and vegetables.  ? Up to 6-8 servings of whole grains each day.  ? Less than 6 oz of lean meat, poultry, or fish each day. A 3-oz serving of meat is about the same size as a deck of cards. One egg equals 1 oz.  ? 2 servings of low-fat dairy each day.  ? A serving of nuts, seeds, or beans 5 times each week.  ? Heart-healthy fats. Healthy fats called Omega-3 fatty acids are found in foods such as flaxseeds and coldwater fish, like sardines, salmon, and mackerel.  · Limit how much you eat of the following:  ? Canned or prepackaged foods.  ? Food that is high in trans fat, such as fried foods.  ? Food that is high in saturated fat, such as fatty meat.  ? Sweets, desserts, sugary drinks, and other foods with added sugar.  ? Full-fat dairy products.  · Do not salt foods before eating.  · Try to eat at least 2 vegetarian meals each week.  · Eat more home-cooked food and less restaurant, buffet, and fast food.  · When eating at a restaurant, ask that your food be prepared with less salt or no salt, if possible.  What foods are recommended?  The items listed may not be a complete list. Talk with your dietitian about what dietary choices are best for you.  Grains  Whole-grain or whole-wheat bread. Whole-grain or whole-wheat pasta. Brown rice. Oatmeal. Quinoa. Bulgur. Whole-grain and low-sodium cereals. Sandee bread. Low-fat, low-sodium crackers. Whole-wheat flour tortillas.  Vegetables  Fresh or frozen vegetables (raw, steamed, roasted, or grilled). Low-sodium or reduced-sodium tomato and vegetable juice. Low-sodium or reduced-sodium tomato sauce and tomato paste. Low-sodium or reduced-sodium canned vegetables.  Fruits  All fresh, dried, or frozen fruit. Canned fruit in natural juice (without  added sugar).  Meat and other protein foods  Skinless chicken or turkey. Ground chicken or turkey. Pork with fat trimmed off. Fish and seafood. Egg whites. Dried beans, peas, or lentils. Unsalted nuts, nut butters, and seeds. Unsalted canned beans. Lean cuts of beef with fat trimmed off. Low-sodium, lean deli meat.  Dairy  Low-fat (1%) or fat-free (skim) milk. Fat-free, low-fat, or reduced-fat cheeses. Nonfat, low-sodium ricotta or cottage cheese. Low-fat or nonfat yogurt. Low-fat, low-sodium cheese.  Fats and oils  Soft margarine without trans fats. Vegetable oil. Low-fat, reduced-fat, or light mayonnaise and salad dressings (reduced-sodium). Canola, safflower, olive, soybean, and sunflower oils. Avocado.  Seasoning and other foods  Herbs. Spices. Seasoning mixes without salt. Unsalted popcorn and pretzels. Fat-free sweets.  What foods are not recommended?  The items listed may not be a complete list. Talk with your dietitian about what dietary choices are best for you.  Grains  Baked goods made with fat, such as croissants, muffins, or some breads. Dry pasta or rice meal packs.  Vegetables  Creamed or fried vegetables. Vegetables in a cheese sauce. Regular canned vegetables (not low-sodium or reduced-sodium). Regular canned tomato sauce and paste (not low-sodium or reduced-sodium). Regular tomato and vegetable juice (not low-sodium or reduced-sodium). Pickles. Olives.  Fruits  Canned fruit in a light or heavy syrup. Fried fruit. Fruit in cream or butter sauce.  Meat and other protein foods  Fatty cuts of meat. Ribs. Fried meat. Villaseñor. Sausage. Bologna and other processed lunch meats. Salami. Fatback. Hotdogs. Bratwurst. Salted nuts and seeds. Canned beans with added salt. Canned or smoked fish. Whole eggs or egg yolks. Chicken or turkey with skin.  Dairy  Whole or 2% milk, cream, and half-and-half. Whole or full-fat cream cheese. Whole-fat or sweetened yogurt. Full-fat cheese. Nondairy creamers. Whipped toppings.  Processed cheese and cheese spreads.  Fats and oils  Butter. Stick margarine. Lard. Shortening. Ghee. Villaseñor fat. Tropical oils, such as coconut, palm kernel, or palm oil.  Seasoning and other foods  Salted popcorn and pretzels. Onion salt, garlic salt, seasoned salt, table salt, and sea salt. Worcestershire sauce. Tartar sauce. Barbecue sauce. Teriyaki sauce. Soy sauce, including reduced-sodium. Steak sauce. Canned and packaged gravies. Fish sauce. Oyster sauce. Cocktail sauce. Horseradish that you find on the shelf. Ketchup. Mustard. Meat flavorings and tenderizers. Bouillon cubes. Hot sauce and Tabasco sauce. Premade or packaged marinades. Premade or packaged taco seasonings. Relishes. Regular salad dressings.  Where to find more information:  · National Heart, Lung, and Blood [place]: www.nhlbi.nih.gov  · American Heart Association: www.heart.org  Summary  · The DASH eating plan is a healthy eating plan that has been shown to reduce high blood pressure (hypertension). It may also reduce your risk for type 2 diabetes, heart disease, and stroke.  · With the DASH eating plan, you should limit salt (sodium) intake to 2,300 mg a day. If you have hypertension, you may need to reduce your sodium intake to 1,500 mg a day.  · When on the DASH eating plan, aim to eat more fresh fruits and vegetables, whole grains, lean proteins, low-fat dairy, and heart-healthy fats.  · Work with your health care provider or diet and nutrition specialist (dietitian) to adjust your eating plan to your individual calorie needs.  This information is not intended to replace advice given to you by your health care provider. Make sure you discuss any questions you have with your health care provider.  Document Released: 12/06/2012 Document Revised: 12/11/2017 Document Reviewed: 12/11/2017  [employer] Interactive Patient Education © 2019 [employer]

## 2019-06-13 NOTE — PROGRESS NOTES
Subjective   Aldair Wesley is a 54 y.o. male.     He increased lisinopril to 5 mg about 2 days he is blood pressure readings at home have been 140-150/'s. He is exercising routinely.       Hypertension   This is a chronic problem. The current episode started more than 1 year ago. The problem is uncontrolled. Pertinent negatives include no anxiety, blurred vision, chest pain, headaches, orthopnea, palpitations, peripheral edema, PND or shortness of breath. Current antihypertension treatment includes ACE inhibitors.        The following portions of the patient's history were reviewed and updated as appropriate: allergies, current medications, past family history, past medical history, past social history, past surgical history and problem list.    Review of Systems   Constitutional: Negative for activity change, appetite change, fatigue and fever.   Eyes: Negative for blurred vision and visual disturbance.   Respiratory: Negative for cough, shortness of breath and wheezing.    Cardiovascular: Negative for chest pain, palpitations, orthopnea, leg swelling and PND.        Elevated blood pressure    Neurological: Negative for dizziness and headaches.       Objective   Physical Exam   Constitutional: He is oriented to person, place, and time. He appears well-developed and well-nourished.   HENT:   Head: Normocephalic.   Nose: Nose normal.   Neck: Carotid bruit is not present. No thyroid mass and no thyromegaly present.   Cardiovascular: Regular rhythm and normal heart sounds. Exam reveals no S3 and no S4.   No murmur heard.  Repeat bp left arm 142/96  No pedal edema    Pulmonary/Chest: Effort normal and breath sounds normal. He has no decreased breath sounds. He has no wheezes. He has no rhonchi. He has no rales.   Musculoskeletal: He exhibits no edema.   Neurological: He is alert and oriented to person, place, and time. Gait normal.   Skin: Skin is warm and dry.   Psychiatric: He has a normal mood and affect.        Assessment/Plan   Aldair was seen today for elevated blood pressure.    Diagnoses and all orders for this visit:    Essential hypertension  -     lisinopril (PRINIVIL,ZESTRIL) 10 MG tablet; Take 1 tablet by mouth Daily.    Elevated PSA  Comments:  saw urologist last week and will repeat psa in several months    He will monitor blood pressure and return in 2 weeks.

## 2019-07-11 ENCOUNTER — HOSPITAL ENCOUNTER (OUTPATIENT)
Facility: HOSPITAL | Age: 54
Setting detail: HOSPITAL OUTPATIENT SURGERY
Discharge: HOME | End: 2019-07-11
Attending: SURGERY | Admitting: SURGERY

## 2019-07-11 ENCOUNTER — ANESTHESIA EVENT (OUTPATIENT)
Dept: GASTROENTEROLOGY | Facility: HOSPITAL | Age: 54
End: 2019-07-11

## 2019-07-11 ENCOUNTER — ANESTHESIA (OUTPATIENT)
Dept: GASTROENTEROLOGY | Facility: HOSPITAL | Age: 54
End: 2019-07-11

## 2019-07-11 VITALS
DIASTOLIC BLOOD PRESSURE: 91 MMHG | BODY MASS INDEX: 27.15 KG/M2 | TEMPERATURE: 97.4 F | OXYGEN SATURATION: 98 % | HEIGHT: 67 IN | SYSTOLIC BLOOD PRESSURE: 114 MMHG | HEART RATE: 61 BPM | WEIGHT: 173 LBS | RESPIRATION RATE: 16 BRPM

## 2019-07-11 DIAGNOSIS — Z12.11 ENCOUNTER FOR SCREENING COLONOSCOPY: ICD-10-CM

## 2019-07-11 PROCEDURE — 88305 TISSUE EXAM BY PATHOLOGIST: CPT | Performed by: SURGERY

## 2019-07-11 PROCEDURE — S0260 H&P FOR SURGERY: HCPCS | Performed by: SURGERY

## 2019-07-11 PROCEDURE — 25010000002 PROPOFOL 10 MG/ML EMULSION: Performed by: ANESTHESIOLOGY

## 2019-07-11 PROCEDURE — 45380 COLONOSCOPY AND BIOPSY: CPT | Performed by: SURGERY

## 2019-07-11 RX ORDER — SODIUM CHLORIDE 0.9 % (FLUSH) 0.9 %
3 SYRINGE (ML) INJECTION AS NEEDED
Status: DISCONTINUED | OUTPATIENT
Start: 2019-07-11 | End: 2019-07-11 | Stop reason: HOSPADM

## 2019-07-11 RX ORDER — SODIUM CHLORIDE, SODIUM LACTATE, POTASSIUM CHLORIDE, CALCIUM CHLORIDE 600; 310; 30; 20 MG/100ML; MG/100ML; MG/100ML; MG/100ML
1000 INJECTION, SOLUTION INTRAVENOUS CONTINUOUS
Status: DISCONTINUED | OUTPATIENT
Start: 2019-07-11 | End: 2019-07-11 | Stop reason: HOSPADM

## 2019-07-11 RX ORDER — LIDOCAINE HYDROCHLORIDE 10 MG/ML
0.5 INJECTION, SOLUTION INFILTRATION; PERINEURAL ONCE AS NEEDED
Status: DISCONTINUED | OUTPATIENT
Start: 2019-07-11 | End: 2019-07-11 | Stop reason: HOSPADM

## 2019-07-11 RX ORDER — PROPOFOL 10 MG/ML
VIAL (ML) INTRAVENOUS AS NEEDED
Status: DISCONTINUED | OUTPATIENT
Start: 2019-07-11 | End: 2019-07-11 | Stop reason: SURG

## 2019-07-11 RX ORDER — LIDOCAINE HYDROCHLORIDE 20 MG/ML
INJECTION, SOLUTION INFILTRATION; PERINEURAL AS NEEDED
Status: DISCONTINUED | OUTPATIENT
Start: 2019-07-11 | End: 2019-07-11 | Stop reason: SURG

## 2019-07-11 RX ADMIN — LIDOCAINE HYDROCHLORIDE 100 MG: 20 INJECTION, SOLUTION INFILTRATION; PERINEURAL at 08:47

## 2019-07-11 RX ADMIN — PROPOFOL 200 MG: 10 INJECTION, EMULSION INTRAVENOUS at 08:47

## 2019-07-11 RX ADMIN — SODIUM CHLORIDE, POTASSIUM CHLORIDE, SODIUM LACTATE AND CALCIUM CHLORIDE 1000 ML: 600; 310; 30; 20 INJECTION, SOLUTION INTRAVENOUS at 08:05

## 2019-07-11 RX ADMIN — PROPOFOL 200 MG: 10 INJECTION, EMULSION INTRAVENOUS at 08:55

## 2019-07-11 NOTE — ANESTHESIA POSTPROCEDURE EVALUATION
"Patient: Aldair Wesley    Procedure Summary     Date:  07/11/19 Room / Location:   [facility]    Anesthesia Start:  0840 Anesthesia Stop:  0916    Procedure:  COLONOSCOPY TO CECUM TO TERMINAL ILEUM WITH BIOPSY (N/A ) Diagnosis:       Encounter for screening colonoscopy      (Encounter for screening colonoscopy [Z12.11])    Surgeon:  Lang Ndiaye Jr., MD Provider:  Caterina Aguiar MD    Anesthesia Type:  MAC ASA Status:  2          Anesthesia Type: MAC  Last vitals  BP   95/69 (07/11/19 0926)   Temp   36.9 °C (98.4 °F) (07/11/19 0757)   Pulse   74 (07/11/19 0926)   Resp   16 (07/11/19 0926)     SpO2   96 % (07/11/19 0926)     Post Anesthesia Care and Evaluation    Patient location during evaluation: PACU  Patient participation: complete - patient participated  Level of consciousness: awake  Pain score: 0  Pain management: adequate  Airway patency: patent  Anesthetic complications: No anesthetic complications    Cardiovascular status: acceptable  Respiratory status: acceptable  Hydration status: acceptable    Comments: Blood pressure 95/69, pulse 74, temperature 36.9 °C (98.4 °F), temperature source Oral, resp. rate 16, height 170.2 cm (67\"), weight 78.5 kg (173 lb), SpO2 96 %.    No anesthesia care post op    "

## 2019-07-11 NOTE — H&P
Patient Care Team:  Donna Harkins APRN as PCP - General (Family Medicine)    Chief complaint:  Need for screening colonoscopy    Subjective     History of Present Illness     The patient is a very pleasant 54-year-old -American male who presents for screening colonoscopy.  He has no significant GI symptoms.  He has never had a previous colonoscopy    Review of Systems   Constitutional: Negative for activity change, appetite change, fatigue and fever.   HENT: Negative for trouble swallowing and voice change.    Respiratory: Negative for chest tightness and shortness of breath.    Cardiovascular: Negative for chest pain and palpitations.   Gastrointestinal: Negative for abdominal pain, blood in stool, constipation, diarrhea, nausea and vomiting.   Endocrine: Negative for cold intolerance and heat intolerance.   Genitourinary: Negative for dysuria and flank pain.   Neurological: Negative for dizziness and light-headedness.   Hematological: Negative for adenopathy. Does not bruise/bleed easily.   Psychiatric/Behavioral: Negative for agitation and confusion.        Past Medical History:   Diagnosis Date   • Hypertension      Past Surgical History:   Procedure Laterality Date   • APPENDECTOMY       Family History   Problem Relation Age of Onset   • Ovarian cancer Mother    • Heart attack Father    • Hypertension Sister    • Sarcoidosis Brother    • Hypertension Brother    • Hypertension Sister      Social History     Tobacco Use   • Smoking status: Never Smoker   • Smokeless tobacco: Never Used   Substance Use Topics   • Alcohol use: Yes     Comment: occasional   • Drug use: No     Allergies:  Patient has no known allergies.    Objective      Vital Signs  Temp:  [98.4 °F (36.9 °C)] 98.4 °F (36.9 °C)  Heart Rate:  [75] 75  Resp:  [16] 16  BP: (140)/(91) 140/91    Physical Exam   Constitutional: He is oriented to person, place, and time. He appears well-developed and well-nourished.  Non-toxic appearance.    Eyes: EOM are normal. No scleral icterus.   Pulmonary/Chest: Effort normal. No respiratory distress.   Abdominal: Soft. Normal appearance. There is no tenderness.   Neurological: He is alert and oriented to person, place, and time.   Skin: Skin is warm and dry.   Psychiatric: He has a normal mood and affect. His behavior is normal. Judgment and thought content normal.       Results Review:   I reviewed the patient's new clinical results.      Assessment/Plan       Encounter for screening colonoscopy      Assessment & Plan     1.  Need screening colonoscopy: Plan colonoscopy.  The patient understands the indications, alternatives, risks, and benefits of the procedure and wishes to proceed.    I discussed the patients findings and my recommendations with patient    Lang Ndiaye Jr., MD  07/11/19  8:43 AM

## 2019-07-11 NOTE — DISCHARGE INSTRUCTIONS
Colonoscopy, Adult, Care After  DR CHAMBERS [phone]  This sheet gives you information about how to care for yourself after your procedure. Your health care provider may also give you more specific instructions. If you have problems or questions, contact your health care provider.  What can I expect after the procedure?  After the procedure, it is common to have:  · A small amount of blood in your stool for 24 hours after the procedure.  · Some gas.  · Mild abdominal cramping or bloating.    Follow these instructions at home:  General instructions    · For the first 24 hours after the procedure:  ? Do not drive or use machinery.  ? Do not sign important documents.  ? Do not drink alcohol.  ? Do your regular daily activities at a slower pace than normal.  ? Eat soft, easy-to-digest foods.  ? Rest often.  · Take over-the-counter or prescription medicines only as told by your health care provider.  · It is up to you to get the results of your procedure. Ask your health care provider, or the department performing the procedure, when your results will be ready.  Relieving cramping and bloating  · Try walking around when you have cramps or feel bloated.  · Apply heat to your abdomen as told by your health care provider. Use a heat source that your health care provider recommends, such as a moist heat pack or a heating pad.  ? Place a towel between your skin and the heat source.  ? Leave the heat on for 20-30 minutes.  ? Remove the heat if your skin turns bright red. This is especially important if you are unable to feel pain, heat, or cold. You may have a greater risk of getting burned.  Eating and drinking  · Drink enough fluid to keep your urine clear or pale yellow.  · Resume your normal diet as instructed by your health care provider. Avoid heavy or fried foods that are hard to digest.  · Avoid drinking alcohol for as long as instructed by your health care provider.  Contact a health care provider if:  · You have blood  in your stool 2-3 days after the procedure.  Get help right away if:  · You have more than a small spotting of blood in your stool.  · You pass large blood clots in your stool.  · Your abdomen is swollen.  · You have nausea or vomiting.  · You have a fever.  · You have increasing abdominal pain that is not relieved with medicine.  This information is not intended to replace advice given to you by your health care provider. Make sure you discuss any questions you have with your health care provider.  Document Released: 08/01/2005 Document Revised: 09/11/2017 Document Reviewed: 02/28/2017  [employer] Interactive Patient Education © 2018 [employer]  Colitis  Colitis is inflammation of the colon. Colitis may last a short time (acute) or it may last a long time (chronic).  What are the causes?  This condition may be caused by:  · Viruses.  · Bacteria.  · Reactions to medicine.  · Certain autoimmune diseases, such as Crohn disease or ulcerative colitis.    What are the signs or symptoms?  Symptoms of this condition include:  · Diarrhea.  · Passing bloody or tarry stool.  · Pain.  · Fever.  · Vomiting.  · Tiredness (fatigue).  · Weight loss.  · Bloating.  · Sudden increase in abdominal pain.  · Having fewer bowel movements than usual.    How is this diagnosed?  This condition is diagnosed with a stool test or a blood test. You may also have other tests, including X-rays, a CT scan, or a colonoscopy.  How is this treated?  Treatment may include:  · Resting the bowel. This involves not eating or drinking for a period of time.  · Fluids that are given through an IV tube.  · Medicine for pain and diarrhea.  · Antibiotic medicines.  · Cortisone medicines.  · Surgery.    Follow these instructions at home:  Eating and drinking  · Follow instructions from your health care provider about eating or drinking restrictions.  · Drink enough fluid to keep your urine clear or pale yellow.  · Work with a dietitian to determine which foods  cause your condition to flare up.  · Avoid foods that cause flare-ups.  · Eat a well-balanced diet.  Medicines  · Take over-the-counter and prescription medicines only as told by your health care provider.  · If you were prescribed an antibiotic medicine, take it as told by your health care provider. Do not stop taking the antibiotic even if you start to feel better.  General instructions  · Keep all follow-up visits as told by your health care provider. This is important.  Contact a health care provider if:  · Your symptoms do not go away.  · You develop new symptoms.  Get help right away if:  · You have a fever that does not go away with treatment.  · You develop chills.  · You have extreme weakness, fainting, or dehydration.  · You have repeated vomiting.  · You develop severe pain in your abdomen.  · You pass bloody or tarry stool.  This information is not intended to replace advice given to you by your health care provider. Make sure you discuss any questions you have with your health care provider.  Document Released: 01/25/2006 Document Revised: 05/25/2017 Document Reviewed: 04/11/2016  [employer] Interactive Patient Education © 2019 [employer]

## 2019-07-11 NOTE — ANESTHESIA PREPROCEDURE EVALUATION
Anesthesia Evaluation     Patient summary reviewed and Nursing notes reviewed   NPO Solid Status: > 8 hours  NPO Liquid Status: < 2 hours           Airway   Mallampati: I  TM distance: >3 FB  Neck ROM: full  No difficulty expected  Dental - normal exam     Pulmonary - normal exam   Cardiovascular - normal exam    (+) hypertension less than 2 medications,       Neuro/Psych  GI/Hepatic/Renal/Endo      Musculoskeletal     Abdominal  - normal exam    Bowel sounds: normal.   Substance History      OB/GYN          Other                        Anesthesia Plan    ASA 2     MAC     Anesthetic plan, all risks, benefits, and alternatives have been provided, discussed and informed consent has been obtained with: patient.

## 2019-07-12 LAB
CYTO UR: NORMAL
LAB AP CASE REPORT: NORMAL
LAB AP DIAGNOSIS COMMENT: NORMAL
PATH REPORT.FINAL DX SPEC: NORMAL
PATH REPORT.GROSS SPEC: NORMAL

## 2019-07-17 NOTE — OP NOTE
Surgeon:     Lang Ndiaye Jr., M.D.    Preoperative Diagnosis:     Need for screening colonoscopy    Postoperative Diagnosis:     Right colitis and proctitis    Procedure Performed:     Colonoscopy with evaluation of terminal ileum and biopsy of colitis and proctitis    Indications:     The patient is a pleasant 54-year-old -American male who presents for screening colonoscopy.  The patient understands the indications, alternatives, risks, and benefits of the procedure and wishes to proceed.    Procedure:     The patient was identified, taken to the endoscopy suite, and place in the left side down decubitus procedure.  The patient underwent a MAC anesthesia and was appropriately monitored through the case by the anesthesia personnel.  A rectal exam was performed that was normal.  The colonoscope was introduced into the rectum and advanced very carefully to the cecum that was identified by the cecal strap, ileocecal valve, and the appendiceal orifice.  The scope was then slowly withdrawn with careful circumferential examination of the mucosa performed.  The bowel prep was good allowing adequate visualization of mucosal surfaces.  The scope was withdrawn.  The patient tolerated the procedure well and was transferred the recovery area in stable condition.    Findings:    There was colitis involving the rectum from the anal verge to 15 cm.  Biopsies were taken.    There was colitis involving the cecum but the terminal ileum was spared.  Biopsies were taken.    Recommendations:     1.  Await pathology results from the biopsies of the colitis.  2.  Further treatment will depend upon the biopsy results.    Lang Ndiaye Jr., M.D.   10

## 2019-07-18 ENCOUNTER — TELEPHONE (OUTPATIENT)
Dept: SURGERY | Facility: CLINIC | Age: 54
End: 2019-07-18

## 2019-07-19 ENCOUNTER — TELEPHONE (OUTPATIENT)
Dept: SURGERY | Facility: CLINIC | Age: 54
End: 2019-07-19

## 2019-07-19 NOTE — TELEPHONE ENCOUNTER
"Dr. Ndiaye informed me \"Please let him know that he had nonspecific inflammation and to treatment is necessary. Thanks\"  Called pt back with results. Told him if he was still having issues into next week to give us a call-RE    "

## 2019-07-24 ENCOUNTER — TELEPHONE (OUTPATIENT)
Dept: SURGERY | Facility: CLINIC | Age: 54
End: 2019-07-24

## 2019-07-24 RX ORDER — MESALAMINE 1000 MG/1
1000 SUPPOSITORY RECTAL NIGHTLY
Qty: 10 SUPPOSITORY | Refills: 0 | Status: SHIPPED | OUTPATIENT
Start: 2019-07-24 | End: 2019-08-03

## 2019-07-24 RX ORDER — CIPROFLOXACIN 500 MG/1
500 TABLET, FILM COATED ORAL 2 TIMES DAILY
Qty: 20 TABLET | Refills: 0 | Status: SHIPPED | OUTPATIENT
Start: 2019-07-24 | End: 2019-12-30

## 2019-07-24 RX ORDER — METRONIDAZOLE 500 MG/1
500 TABLET ORAL 3 TIMES DAILY
Qty: 30 TABLET | Refills: 0 | Status: SHIPPED | OUTPATIENT
Start: 2019-07-24 | End: 2019-08-03

## 2019-07-24 NOTE — TELEPHONE ENCOUNTER
Aldair has called stating he is still having some blood on tissue after BM, Diarrhea, lot of gas.

## 2019-07-24 NOTE — TELEPHONE ENCOUNTER
The patient was called on the telephone but did not answer.  He was advised that I will send prescriptions to the pharmacy to treat his symptoms.  He was noted to have colitis involving the cecum and the rectum.  Biopsies showed a nonspecific colitis.  He continues to have intermittent rectal bleeding.  Prescriptions for Cipro, Flagyl, and mesalamine suppositories were sent to his pharmacy.  Please make an appointment for this patient to see me in 1 month.

## 2019-07-25 NOTE — TELEPHONE ENCOUNTER
Spoke with Aldair and informed of antibiotics being sent to pharmacy. Follow up appt made for 8/20

## 2019-08-30 DIAGNOSIS — I10 ESSENTIAL HYPERTENSION: ICD-10-CM

## 2019-08-30 RX ORDER — LISINOPRIL 10 MG/1
10 TABLET ORAL DAILY
Qty: 30 TABLET | Refills: 5 | Status: SHIPPED | OUTPATIENT
Start: 2019-08-30 | End: 2020-04-02

## 2019-12-30 ENCOUNTER — OFFICE VISIT (OUTPATIENT)
Dept: INTERNAL MEDICINE | Facility: CLINIC | Age: 54
End: 2019-12-30

## 2019-12-30 VITALS
SYSTOLIC BLOOD PRESSURE: 134 MMHG | HEART RATE: 78 BPM | WEIGHT: 175 LBS | DIASTOLIC BLOOD PRESSURE: 80 MMHG | OXYGEN SATURATION: 98 % | BODY MASS INDEX: 27.41 KG/M2

## 2019-12-30 DIAGNOSIS — R07.89 CHEST PRESSURE: Primary | ICD-10-CM

## 2019-12-30 DIAGNOSIS — I10 ESSENTIAL HYPERTENSION: ICD-10-CM

## 2019-12-30 PROCEDURE — 99213 OFFICE O/P EST LOW 20 MIN: CPT | Performed by: NURSE PRACTITIONER

## 2019-12-30 PROCEDURE — 93000 ELECTROCARDIOGRAM COMPLETE: CPT | Performed by: NURSE PRACTITIONER

## 2019-12-30 RX ORDER — FAMOTIDINE 20 MG/1
20 TABLET, FILM COATED ORAL DAILY
Qty: 30 TABLET | Refills: 1 | Status: SHIPPED | OUTPATIENT
Start: 2019-12-30 | End: 2020-12-28

## 2019-12-30 NOTE — PROGRESS NOTES
Subjective     Aldair Wesley is a 54 y.o. male.         He presents with chest pressure intermittently for  about 2 months now. He denies chest pain, cough, SOB, heartburn and belching. His dad passed away from MI at 81 yo. Never been a smoker. His brother had pulmonary sarcoidosis. He is a .    He reports that the feeling will occur while walking briskly while pulling a suitcase with wheels behind him. Also reports it happened while walking briskly in cold weather and after eating a meal. He has tried nothing for sx. He does take lisinopril daily for HTN but reports forgetting to take it occasionally.       The following portions of the patient's history were reviewed and updated as appropriate: allergies, current medications, past social history and problem list.    Review of Systems   Constitutional: Negative for fatigue and fever.   Eyes: Negative for visual disturbance.   Respiratory: Negative for cough, shortness of breath and wheezing.    Cardiovascular: Negative for chest pain, palpitations and leg swelling.   Neurological: Negative for dizziness, syncope, weakness and headaches.       Objective     /80 (BP Location: Left arm, Patient Position: Sitting, Cuff Size: Adult)   Pulse 78   Wt 79.4 kg (175 lb)   SpO2 98%   BMI 27.41 kg/m²     Physical Exam   Constitutional: He appears well-developed and well-nourished.   HENT:   Head: Normocephalic and atraumatic.   Cardiovascular: Normal rate, regular rhythm and normal heart sounds.   No murmur heard.  Pulses:       Carotid pulses are 2+ on the right side, and 2+ on the left side.  No leg swelling   Pulmonary/Chest: Effort normal and breath sounds normal. No respiratory distress.   Musculoskeletal: Normal range of motion.   Neurological: He is alert.   Skin: Skin is warm and dry.   Psychiatric: He has a normal mood and affect. His behavior is normal. Judgment and thought content normal.   Vitals reviewed.      Assessment/Plan     Aldair was seen  today for hypertension and chest pain.    Diagnoses and all orders for this visit:    Chest pressure  -     ECG 12 Lead  -     XR Chest 2 View; Future  -     Stress test; Future  -     famotidine (PEPCID) 20 MG tablet; Take 1 tablet by mouth Daily.    Essential hypertension    Continue lisinopril 10 mg. BP good today.    Etiology of chest pressure unknown. Will try pepcid daily for awhile to see if this helps the pressure.    F/u TBD.      ECG 12 Lead  Date/Time: 12/30/2019 4:36 PM  Performed by: Gela Kilgore APRN  Authorized by: Gela Kilgore APRN   Comparison: compared with previous ECG   Similar to previous ECG  Rhythm: sinus rhythm  Rate: normal  Conduction: conduction normal  ST Segments: ST segments normal  T Waves: T waves normal  QRS axis: normal  Other: no other findings    Clinical impression: normal ECG

## 2020-01-07 ENCOUNTER — HOSPITAL ENCOUNTER (OUTPATIENT)
Dept: GENERAL RADIOLOGY | Facility: HOSPITAL | Age: 55
Discharge: HOME | End: 2020-01-07

## 2020-01-07 ENCOUNTER — HOSPITAL ENCOUNTER (OUTPATIENT)
Dept: CARDIOLOGY | Facility: HOSPITAL | Age: 55
Discharge: HOME | End: 2020-01-07
Admitting: NURSE PRACTITIONER

## 2020-01-07 DIAGNOSIS — R07.89 CHEST PRESSURE: ICD-10-CM

## 2020-01-07 PROCEDURE — 93016 CV STRESS TEST SUPVJ ONLY: CPT | Performed by: INTERNAL MEDICINE

## 2020-01-07 PROCEDURE — 71046 X-RAY EXAM CHEST 2 VIEWS: CPT

## 2020-01-07 PROCEDURE — 93017 CV STRESS TEST TRACING ONLY: CPT

## 2020-01-07 PROCEDURE — 93018 CV STRESS TEST I&R ONLY: CPT | Performed by: INTERNAL MEDICINE

## 2020-01-08 LAB
BH CV STRESS BP STAGE 1: NORMAL
BH CV STRESS BP STAGE 2: NORMAL
BH CV STRESS BP STAGE 3: NORMAL
BH CV STRESS DURATION MIN STAGE 1: 3
BH CV STRESS DURATION MIN STAGE 2: 3
BH CV STRESS DURATION MIN STAGE 3: 3
BH CV STRESS DURATION SEC STAGE 1: 0
BH CV STRESS DURATION SEC STAGE 2: 0
BH CV STRESS DURATION SEC STAGE 3: 0
BH CV STRESS GRADE STAGE 1: 10
BH CV STRESS GRADE STAGE 2: 12
BH CV STRESS GRADE STAGE 3: 14
BH CV STRESS HR STAGE 1: 126
BH CV STRESS HR STAGE 2: 158
BH CV STRESS HR STAGE 3: 172
BH CV STRESS METS STAGE 1: 5
BH CV STRESS METS STAGE 2: 7.5
BH CV STRESS METS STAGE 3: 10
BH CV STRESS PROTOCOL 1: NORMAL
BH CV STRESS RECOVERY BP: NORMAL MMHG
BH CV STRESS RECOVERY HR: 110 BPM
BH CV STRESS SPEED STAGE 1: 1.7
BH CV STRESS SPEED STAGE 2: 2.5
BH CV STRESS SPEED STAGE 3: 3.4
BH CV STRESS STAGE 1: 1
BH CV STRESS STAGE 2: 2
BH CV STRESS STAGE 3: 3
MAXIMAL PREDICTED HEART RATE: 166 BPM
PERCENT MAX PREDICTED HR: 106.02 %
STRESS BASELINE BP: NORMAL MMHG
STRESS BASELINE HR: 82 BPM
STRESS PERCENT HR: 125 %
STRESS POST ESTIMATED WORKLOAD: 10.3 METS
STRESS POST EXERCISE DUR MIN: 9 MIN
STRESS POST EXERCISE DUR SEC: 0 SEC
STRESS POST PEAK BP: NORMAL MMHG
STRESS POST PEAK HR: 176 BPM
STRESS TARGET HR: 141 BPM

## 2020-01-09 ENCOUNTER — TELEPHONE (OUTPATIENT)
Dept: INTERNAL MEDICINE | Facility: CLINIC | Age: 55
End: 2020-01-09

## 2020-01-09 DIAGNOSIS — I10 ESSENTIAL HYPERTENSION: ICD-10-CM

## 2020-01-09 DIAGNOSIS — R07.89 CHEST PRESSURE: Primary | ICD-10-CM

## 2020-01-09 NOTE — TELEPHONE ENCOUNTER
Patient called requesting the results of the stress test that he had performed on Tuesday (1/7/20). The patient stated that the stress test was ordered by Gela Kilgore. The patient said that the nurse who administered his stress test informed him that the results would be available for him to view on [facility] by the end of the day on Tuesday (1/7/20), but he has checked MyChart and he has still not seen any results from this test. The patient requested a call back at [phone] when this message has been received with the results of his stress test.

## 2020-01-09 NOTE — TELEPHONE ENCOUNTER
Spoke to pt and let him know of mostly normal results of stress test besides a hypertensive episode at the beginning. Because of this we will order echo. He will monitor his BP daily and bring it with him to f/u. He will f/u in 2 weeks with Donna. He will call and schedule this appt.

## 2020-01-20 ENCOUNTER — TELEPHONE (OUTPATIENT)
Dept: INTERNAL MEDICINE | Facility: CLINIC | Age: 55
End: 2020-01-20

## 2020-01-20 NOTE — TELEPHONE ENCOUNTER
Patient called to let dr know that he is felling better he stated that he went on a 3 day ski trip and had not chest pressure and was ok. He wanted to touch base on if he should still get the echo cardio gram or if he just just hold off. Please call and advise pt at [phone].

## 2020-01-20 NOTE — TELEPHONE ENCOUNTER
I called patient to discuss his concerns. I would recommend ECHO due to hypertensive responsive to stress. He continues to monitor blood pressure and readings fluctuate 130-140/80-90's. He reports no chest pressure. He will schedule follow up appointment with me.

## 2020-02-06 ENCOUNTER — HOSPITAL ENCOUNTER (OUTPATIENT)
Dept: CARDIOLOGY | Facility: HOSPITAL | Age: 55
Discharge: HOME | End: 2020-02-06
Admitting: NURSE PRACTITIONER

## 2020-02-06 VITALS
SYSTOLIC BLOOD PRESSURE: 151 MMHG | HEART RATE: 61 BPM | WEIGHT: 175 LBS | HEIGHT: 67 IN | BODY MASS INDEX: 27.47 KG/M2 | DIASTOLIC BLOOD PRESSURE: 91 MMHG

## 2020-02-06 DIAGNOSIS — R07.89 CHEST PRESSURE: ICD-10-CM

## 2020-02-06 DIAGNOSIS — I10 ESSENTIAL HYPERTENSION: ICD-10-CM

## 2020-02-06 LAB
AORTIC ARCH: 2.7 CM
AORTIC ROOT ANNULUS: 2.1 CM
ASCENDING AORTA: 2.8 CM
BH CV ECHO MEAS - ACS: 1.9 CM
BH CV ECHO MEAS - AO MAX PG (FULL): 3.4 MMHG
BH CV ECHO MEAS - AO MAX PG: 7.5 MMHG
BH CV ECHO MEAS - AO MEAN PG (FULL): 2 MMHG
BH CV ECHO MEAS - AO MEAN PG: 4 MMHG
BH CV ECHO MEAS - AO V2 MAX: 137 CM/SEC
BH CV ECHO MEAS - AO V2 MEAN: 98.9 CM/SEC
BH CV ECHO MEAS - AO V2 VTI: 32.2 CM
BH CV ECHO MEAS - AVA(I,A): 2.4 CM^2
BH CV ECHO MEAS - AVA(I,D): 2.4 CM^2
BH CV ECHO MEAS - AVA(V,A): 2.6 CM^2
BH CV ECHO MEAS - AVA(V,D): 2.6 CM^2
BH CV ECHO MEAS - BSA(HAYCOCK): 2 M^2
BH CV ECHO MEAS - BSA: 1.9 M^2
BH CV ECHO MEAS - BZI_BMI: 27.4 KILOGRAMS/M^2
BH CV ECHO MEAS - BZI_METRIC_HEIGHT: 170.2 CM
BH CV ECHO MEAS - BZI_METRIC_WEIGHT: 79.4 KG
BH CV ECHO MEAS - EDV(MOD-SP2): 79 ML
BH CV ECHO MEAS - EDV(MOD-SP4): 103 ML
BH CV ECHO MEAS - EDV(TEICH): 118.2 ML
BH CV ECHO MEAS - EF(CUBED): 80.5 %
BH CV ECHO MEAS - EF(MOD-BP): 58 %
BH CV ECHO MEAS - EF(MOD-SP2): 58.2 %
BH CV ECHO MEAS - EF(MOD-SP4): 57.3 %
BH CV ECHO MEAS - EF(TEICH): 72.8 %
BH CV ECHO MEAS - ESV(MOD-SP2): 33 ML
BH CV ECHO MEAS - ESV(MOD-SP4): 44 ML
BH CV ECHO MEAS - ESV(TEICH): 32.2 ML
BH CV ECHO MEAS - FS: 42 %
BH CV ECHO MEAS - IVS/LVPW: 1
BH CV ECHO MEAS - IVSD: 0.9 CM
BH CV ECHO MEAS - LAT PEAK E' VEL: 11 CM/SEC
BH CV ECHO MEAS - LV DIASTOLIC VOL/BSA (35-75): 53.9 ML/M^2
BH CV ECHO MEAS - LV MASS(C)D: 158.2 GRAMS
BH CV ECHO MEAS - LV MASS(C)DI: 82.8 GRAMS/M^2
BH CV ECHO MEAS - LV MAX PG: 4.1 MMHG
BH CV ECHO MEAS - LV MEAN PG: 2 MMHG
BH CV ECHO MEAS - LV SYSTOLIC VOL/BSA (12-30): 23 ML/M^2
BH CV ECHO MEAS - LV V1 MAX: 101 CM/SEC
BH CV ECHO MEAS - LV V1 MEAN: 70.9 CM/SEC
BH CV ECHO MEAS - LV V1 VTI: 22 CM
BH CV ECHO MEAS - LVIDD: 5 CM
BH CV ECHO MEAS - LVIDS: 2.9 CM
BH CV ECHO MEAS - LVLD AP2: 8.2 CM
BH CV ECHO MEAS - LVLD AP4: 8.6 CM
BH CV ECHO MEAS - LVLS AP2: 6.5 CM
BH CV ECHO MEAS - LVLS AP4: 7.5 CM
BH CV ECHO MEAS - LVOT AREA (M): 3.5 CM^2
BH CV ECHO MEAS - LVOT AREA: 3.5 CM^2
BH CV ECHO MEAS - LVOT DIAM: 2.1 CM
BH CV ECHO MEAS - LVPWD: 0.9 CM
BH CV ECHO MEAS - MED PEAK E' VEL: 9 CM/SEC
BH CV ECHO MEAS - MV A DUR: 0.1 SEC
BH CV ECHO MEAS - MV A MAX VEL: 74.7 CM/SEC
BH CV ECHO MEAS - MV DEC SLOPE: 481 CM/SEC^2
BH CV ECHO MEAS - MV DEC TIME: 0.18 SEC
BH CV ECHO MEAS - MV E MAX VEL: 89.5 CM/SEC
BH CV ECHO MEAS - MV E/A: 1.2
BH CV ECHO MEAS - MV MAX PG: 3.4 MMHG
BH CV ECHO MEAS - MV MEAN PG: 1 MMHG
BH CV ECHO MEAS - MV P1/2T MAX VEL: 88.8 CM/SEC
BH CV ECHO MEAS - MV P1/2T: 54.1 MSEC
BH CV ECHO MEAS - MV V2 MAX: 92.7 CM/SEC
BH CV ECHO MEAS - MV V2 MEAN: 53.5 CM/SEC
BH CV ECHO MEAS - MV V2 VTI: 30.7 CM
BH CV ECHO MEAS - MVA P1/2T LCG: 2.5 CM^2
BH CV ECHO MEAS - MVA(P1/2T): 4.1 CM^2
BH CV ECHO MEAS - MVA(VTI): 2.5 CM^2
BH CV ECHO MEAS - PA ACC TIME: 0.17 SEC
BH CV ECHO MEAS - PA MAX PG (FULL): 4.1 MMHG
BH CV ECHO MEAS - PA MAX PG: 5 MMHG
BH CV ECHO MEAS - PA PR(ACCEL): 3 MMHG
BH CV ECHO MEAS - PA V2 MAX: 112 CM/SEC
BH CV ECHO MEAS - PULM A REVS DUR: 0.1 SEC
BH CV ECHO MEAS - PULM A REVS VEL: 26.7 CM/SEC
BH CV ECHO MEAS - PULM DIAS VEL: 42.2 CM/SEC
BH CV ECHO MEAS - PULM S/D: 0.94
BH CV ECHO MEAS - PULM SYS VEL: 39.8 CM/SEC
BH CV ECHO MEAS - PVA(V,A): 1.8 CM^2
BH CV ECHO MEAS - PVA(V,D): 1.8 CM^2
BH CV ECHO MEAS - QP/QS: 0.65
BH CV ECHO MEAS - RAP SYSTOLE: 3 MMHG
BH CV ECHO MEAS - RV MAX PG: 0.94 MMHG
BH CV ECHO MEAS - RV MEAN PG: 1 MMHG
BH CV ECHO MEAS - RV V1 MAX: 48.6 CM/SEC
BH CV ECHO MEAS - RV V1 MEAN: 36.8 CM/SEC
BH CV ECHO MEAS - RV V1 VTI: 12 CM
BH CV ECHO MEAS - RVOT AREA: 4.2 CM^2
BH CV ECHO MEAS - RVOT DIAM: 2.3 CM
BH CV ECHO MEAS - RVSP: 26 MMHG
BH CV ECHO MEAS - SI(CUBED): 52.7 ML/M^2
BH CV ECHO MEAS - SI(LVOT): 39.9 ML/M^2
BH CV ECHO MEAS - SI(MOD-SP2): 24.1 ML/M^2
BH CV ECHO MEAS - SI(MOD-SP4): 30.9 ML/M^2
BH CV ECHO MEAS - SI(TEICH): 45 ML/M^2
BH CV ECHO MEAS - SUP REN AO DIAM: 1.9 CM
BH CV ECHO MEAS - SV(CUBED): 100.6 ML
BH CV ECHO MEAS - SV(LVOT): 76.2 ML
BH CV ECHO MEAS - SV(MOD-SP2): 46 ML
BH CV ECHO MEAS - SV(MOD-SP4): 59 ML
BH CV ECHO MEAS - SV(RVOT): 49.9 ML
BH CV ECHO MEAS - SV(TEICH): 86 ML
BH CV ECHO MEAS - TAPSE (>1.6): 2.5 CM2
BH CV ECHO MEAS - TR MAX VEL: 239 CM/SEC
BH CV ECHO MEASUREMENTS AVERAGE E/E' RATIO: 8.95
BH CV XLRA - RV BASE: 3.2 CM
BH CV XLRA - RV LENGTH: 7.4 CM
BH CV XLRA - RV MID: 2.6 CM
BH CV XLRA - TDI S': 13 CM/SEC
LEFT ATRIUM VOLUME INDEX: 21 ML/M2
LV EF 2D ECHO EST: 58 %
SINUS: 3.1 CM
STJ: 2.4 CM

## 2020-02-06 PROCEDURE — 93306 TTE W/DOPPLER COMPLETE: CPT

## 2020-02-06 PROCEDURE — 93306 TTE W/DOPPLER COMPLETE: CPT | Performed by: INTERNAL MEDICINE

## 2020-02-07 ENCOUNTER — TELEPHONE (OUTPATIENT)
Dept: INTERNAL MEDICINE | Facility: CLINIC | Age: 55
End: 2020-02-07

## 2020-02-07 NOTE — TELEPHONE ENCOUNTER
Patient received his results for his echocardiogram and would like someone to go over the results with him. Patient is not sure how to read them. Please advise and call back.

## 2020-02-07 NOTE — TELEPHONE ENCOUNTER
Please call and let him know his echo is normal except for trace tricuspid regurg which means his tricuspid valve allows very minimal back flow of blood with the valve not closing tight enough. It is just a trace so nothing major to worry about. His EF which is ejection fraction of the heart is normal. The pressures measured were normal as well.

## 2020-04-02 DIAGNOSIS — I10 ESSENTIAL HYPERTENSION: ICD-10-CM

## 2020-04-02 RX ORDER — LISINOPRIL 10 MG/1
10 TABLET ORAL DAILY
Qty: 30 TABLET | Refills: 5 | Status: SHIPPED | OUTPATIENT
Start: 2020-04-02 | End: 2020-12-28 | Stop reason: DRUGHIGH

## 2020-12-15 DIAGNOSIS — I10 ESSENTIAL HYPERTENSION: ICD-10-CM

## 2020-12-15 RX ORDER — LISINOPRIL 10 MG/1
10 TABLET ORAL DAILY
Qty: 30 TABLET | Refills: 5 | OUTPATIENT
Start: 2020-12-15

## 2020-12-23 ENCOUNTER — TELEPHONE (OUTPATIENT)
Dept: INTERNAL MEDICINE | Facility: CLINIC | Age: 55
End: 2020-12-23

## 2020-12-28 ENCOUNTER — OFFICE VISIT (OUTPATIENT)
Dept: INTERNAL MEDICINE | Facility: CLINIC | Age: 55
End: 2020-12-28

## 2020-12-28 VITALS
DIASTOLIC BLOOD PRESSURE: 102 MMHG | WEIGHT: 178 LBS | BODY MASS INDEX: 27.94 KG/M2 | SYSTOLIC BLOOD PRESSURE: 158 MMHG | HEART RATE: 77 BPM | HEIGHT: 67 IN | TEMPERATURE: 98.5 F | RESPIRATION RATE: 18 BRPM | OXYGEN SATURATION: 95 %

## 2020-12-28 DIAGNOSIS — Z23 IMMUNIZATION DUE: ICD-10-CM

## 2020-12-28 DIAGNOSIS — I10 ESSENTIAL HYPERTENSION: Primary | ICD-10-CM

## 2020-12-28 PROCEDURE — 90686 IIV4 VACC NO PRSV 0.5 ML IM: CPT | Performed by: FAMILY MEDICINE

## 2020-12-28 PROCEDURE — 90471 IMMUNIZATION ADMIN: CPT | Performed by: FAMILY MEDICINE

## 2020-12-28 PROCEDURE — 99213 OFFICE O/P EST LOW 20 MIN: CPT | Performed by: FAMILY MEDICINE

## 2020-12-28 RX ORDER — LISINOPRIL 10 MG/1
TABLET ORAL
Qty: 135 TABLET | Refills: 3 | Status: SHIPPED | OUTPATIENT
Start: 2020-12-28 | End: 2021-01-28 | Stop reason: SDUPTHER

## 2020-12-28 NOTE — PROGRESS NOTES
Subjective   Aldair Wesley is a 55 y.o. male.   CC: hypertension  History of Present Illness     Aldair is a 55 year old male who comes in today for blood pressure check.  Hasn't been seen here since May 2019.   He checks his blood pressure at home and it has been running high with the diastolic .    He has a very strong family history of hypertension---both parents and several siblings.    He is not having any symptoms---denies headache, chest pain, dizziness, shortness of breath.    He is a  for [employer].    He did take his medication about 45 minutes before coming in today for his appointment.      The following portions of the patient's history were reviewed and updated as appropriate: allergies, current medications, past family history, past medical history, past social history, past surgical history and problem list.    Review of Systems   Constitutional: Negative for chills and fatigue.   HENT: Negative for congestion.    Respiratory: Negative for cough, chest tightness, shortness of breath and wheezing.    Cardiovascular: Negative for chest pain, palpitations and leg swelling.   Gastrointestinal: Negative for constipation, diarrhea and nausea.   Skin: Negative for rash.   Neurological: Negative for dizziness, weakness, light-headedness and headaches.       Objective   Physical Exam  Vitals signs and nursing note reviewed.   Constitutional:       Appearance: Normal appearance.   Neck:      Musculoskeletal: Normal range of motion and neck supple.   Cardiovascular:      Rate and Rhythm: Normal rate and regular rhythm.      Heart sounds: Normal heart sounds.   Skin:     General: Skin is warm and dry.      Findings: No rash.   Neurological:      Mental Status: He is alert.   Psychiatric:         Mood and Affect: Mood normal.         Behavior: Behavior normal.       Vitals:    12/28/20 1054   BP: (!) 158/102   BP Location: Left arm   Patient Position: Sitting   Cuff Size: Adult   Pulse: 77  "  Resp: 18   Temp: 98.5 °F (36.9 °C)   SpO2: 95%   Weight: 80.7 kg (178 lb)   Height: 170.2 cm (67\")     Assessment/Plan   Diagnoses and all orders for this visit:    1. Essential hypertension (Primary)  -     lisinopril (PRINIVIL,ZESTRIL) 10 MG tablet; Take one and a half tablets every day  Dispense: 135 tablet; Refill: 3    2. Immunization due  -     Fluarix Quad >6 Months ([id number])    After obtaining informed consent, the immunization is given by Mary.    Agrees with increasing dose of Lisinopril.   Prefers to go up to 15 mg rather than jump to the 20 mg.  New script sent in to [facility].    Needs recheck in several months.  He states that he plans to schedule a complete physical with Donna in February.               "

## 2021-01-28 DIAGNOSIS — I10 ESSENTIAL HYPERTENSION: ICD-10-CM

## 2021-01-28 RX ORDER — LISINOPRIL 10 MG/1
TABLET ORAL
Qty: 135 TABLET | Refills: 0 | Status: SHIPPED | OUTPATIENT
Start: 2021-01-28 | End: 2021-05-11 | Stop reason: SDUPTHER

## 2021-01-28 NOTE — TELEPHONE ENCOUNTER
Caller: Aldair Wesley    Relationship: Self    Best call back number: [phone]    Medication needed:   Requested Prescriptions     Pending Prescriptions Disp Refills   • lisinopril (PRINIVIL,ZESTRIL) 10 MG tablet 135 tablet 3     Sig: Take one and a half tablets every day       When do you need the refill by: ASAP    What details did the patient provide when requesting the medication: PT STATES HE HAS 1 DAY LEFT    Does the patient have less than a 3 day supply:  [x] Yes  [] No    What is the patient's preferred pharmacy: [facility]/PHARMACY #[id number] [address] [address] AT [facility] [phone] [facility] [phone]

## 2021-03-24 ENCOUNTER — BULK ORDERING (OUTPATIENT)
Dept: CASE MANAGEMENT | Facility: OTHER | Age: 56
End: 2021-03-24

## 2021-03-24 DIAGNOSIS — Z23 IMMUNIZATION DUE: ICD-10-CM

## 2021-05-11 DIAGNOSIS — I10 ESSENTIAL HYPERTENSION: ICD-10-CM

## 2021-05-11 RX ORDER — LISINOPRIL 10 MG/1
TABLET ORAL
Qty: 135 TABLET | Refills: 0 | Status: SHIPPED | OUTPATIENT
Start: 2021-05-11 | End: 2021-08-03

## 2021-05-11 NOTE — TELEPHONE ENCOUNTER
Caller: Aldair Wesley    Relationship: Self    Best call back number: [phone] (M)    Medication needed:   Requested Prescriptions     Pending Prescriptions Disp Refills   • lisinopril (PRINIVIL,ZESTRIL) 10 MG tablet 135 tablet 0     Sig: Take one and a half tablets every day       When do you need the refill by: 05/11/21    What additional details did the patient provide when requesting the medication: PATIENT HAS 3 DAYS LEFT    Does the patient have less than a 3 day supply:  [x] Yes  [] No    What is the patient's preferred pharmacy: [facility]/PHARMACY #[id number] [place], [place] - [address] AT [facility] [phone] [facility] [phone]

## 2021-05-28 ENCOUNTER — OFFICE VISIT (OUTPATIENT)
Dept: INTERNAL MEDICINE | Facility: CLINIC | Age: 56
End: 2021-05-28

## 2021-05-28 VITALS
OXYGEN SATURATION: 99 % | RESPIRATION RATE: 16 BRPM | SYSTOLIC BLOOD PRESSURE: 140 MMHG | HEIGHT: 67 IN | DIASTOLIC BLOOD PRESSURE: 82 MMHG | HEART RATE: 76 BPM | BODY MASS INDEX: 29.22 KG/M2 | TEMPERATURE: 97.7 F | WEIGHT: 186.2 LBS

## 2021-05-28 DIAGNOSIS — Z76.89 ENCOUNTER TO ESTABLISH CARE: Primary | ICD-10-CM

## 2021-05-28 DIAGNOSIS — R97.20 ELEVATED PSA: Chronic | ICD-10-CM

## 2021-05-28 DIAGNOSIS — I10 ESSENTIAL HYPERTENSION: Chronic | ICD-10-CM

## 2021-05-28 PROBLEM — Z12.11 ENCOUNTER FOR SCREENING COLONOSCOPY: Status: RESOLVED | Noted: 2019-05-29 | Resolved: 2021-05-28

## 2021-05-28 PROCEDURE — 99214 OFFICE O/P EST MOD 30 MIN: CPT | Performed by: NURSE PRACTITIONER

## 2021-05-28 RX ORDER — AMLODIPINE BESYLATE 5 MG/1
5 TABLET ORAL DAILY
Qty: 30 TABLET | Refills: 2 | Status: SHIPPED | OUTPATIENT
Start: 2021-05-28 | End: 2021-08-19

## 2021-05-28 NOTE — PATIENT INSTRUCTIONS
Will add norvasc/ amlodipine 5 mg daily.      Send me a message in a few weeks to let me know how you are doing with medication and blood pressure readings.       It's Nearly Summer Time!    Stay hydrated when outside  If your urine starts to get darker, you are not drinking enough     Protect yourself from ticks and mosquitos  Here are the best ingredients to look for:  · DEET (N,N-diethyl-m-toluamide or N,N-diethyl-3-methyl-benzamide)  · Picaridin  · Oil of lemon eucalyptus (p-menthane-3,8-diol or PMD)  Wear light-colored pants so you can spot ticks easier  If you use a permethrin product, ONLY apply to clothing    Practice Safe Sun!    · Use sun screen SPF >30 daily, reapply regularly per directions on package  · See dermatologist for skin check regularly  · Protect your eyes with sunglasses with UV protection    Poison Ivy  If you are going into areas that may have poison ivy, prepare with a product like IvyX or other ivy blocker.  Wash your clothes and pets after being in an area with ivy when returning home. If you come in contact with poison ivy, try a product like Technu     Other things you should incorporate all year...     Diet:    • Eat vegetables, fruits, whole grain, low-fat dairy, poultry, fish, beans, nontropical vegetable oils, and nuts, but avoid red meat (i.e., Mediterranean-style diet, DASH [Dietary Approaches to Stop Hypertension] diet).  • Limit sugary drinks and sweets.  • Limit saturated and trans fat to 5% to 6% of calories.  • Limit sodium intake to 2,400 mg daily (about one teaspoon table salt [kosher/sea salt have less sodium per teaspoon]).  Weight loss / Calorie Counting Apps:    • Lose It!   • MyFitness Pal   • Works great when you try it with a partner/ friend. It takes about 15 minutes a day but studies show that this simple method of monitoring your intake can help you achieve goals as it keeps you accountable.  I often will ask patients to try these apps just to get an idea of how  much sodium and how many carbohydrates you are taking in.   Exercise:   • Engage in moderate-to-vigorous aerobic activity for at least 40 minutes (on average) three to four times each week.  Wearables:   • Activity tracker   • Step tracker: getting 7,500 steps daily can cut your cardiac risks by 44%   Bone Health:   • Https://www.nof.org/patients/treatment/nutrition/  • Routine weight bearing exercise      If you have not yet had your COVID vaccine, I highly recommend you schedule to have one ASAP.   You are not only protecting your health but you are protecting a love one that may be more vulnerable than you if they were to contract the virus.

## 2021-05-28 NOTE — PROGRESS NOTES
Chief Complaint  Establish Care (fasting) and Annual Exam     Subjective:      History of Present Illness {CC  Problem List  Visit  Diagnosis   Encounters  Notes  Medications  Labs  Result Review Imaging  Media :23}     Aldair Wesley presents to [facility]     This patient was previously with Donna Harkins  he is new to me and is here to establish care today.  The patient was last seen on: 12/28/2020  The patient chronically has: hypertension, elevated PSA     He has had both COVID vaccines.     Hypertension  This is a chronic problem. The current episode started more than 1 year ago. The problem is uncontrolled. Pertinent negatives include no chest pain, headaches or peripheral edema. Risk factors for coronary artery disease include family history and male gender. Past treatments include diuretics and ACE inhibitors. Current antihypertension treatment includes ACE inhibitors. The current treatment provides mild improvement. Compliance problems: salt intake   There is no history of CAD/MI. mild renal insufficiency (no nsaids) .        Hx - elevated PSA - He saw CHASIDY Jay.  Rechecked when not had sex prior to test and improved.    No voiding issues.     Colon Cancer screen: 7/2019 - negative      for UA.   Previously in [employer]      - 3 boys  Currently has GF      Objective:      Physical Exam  Vitals reviewed.   Constitutional:       Appearance: Normal appearance. He is well-developed.   HENT:      Head:      Comments: Wearing mask due to COVID   Neck:      Thyroid: No thyromegaly.   Cardiovascular:      Rate and Rhythm: Normal rate and regular rhythm.      Pulses: Normal pulses.      Heart sounds: Normal heart sounds.   Pulmonary:      Effort: Pulmonary effort is normal.      Breath sounds: Normal breath sounds.      Comments: E/U   Musculoskeletal:      Right lower leg: No edema.      Left lower leg: No edema.   Lymphadenopathy:      Cervical: No  "cervical adenopathy.   Skin:     General: Skin is warm and dry.      Capillary Refill: Capillary refill takes 2 to 3 seconds.   Neurological:      Mental Status: He is alert and oriented to person, place, and time.   Psychiatric:         Mood and Affect: Mood normal.         Behavior: Behavior normal. Behavior is cooperative.         Thought Content: Thought content normal.         Judgment: Judgment normal.        Result Review  Data Reviewed:{ Labs  Result Review  Imaging  Med Tab  Media :23}            Vital Signs:   /82 (BP Location: Left arm, Patient Position: Sitting, Cuff Size: Adult)   Pulse 76   Temp 97.7 °F (36.5 °C) (Temporal)   Resp 16   Ht 170.2 cm (67\")   Wt 84.5 kg (186 lb 3.2 oz)   SpO2 99%   BMI 29.16 kg/m²         Requested Prescriptions     Signed Prescriptions Disp Refills   • amLODIPine (Norvasc) 5 MG tablet 30 tablet 2     Sig: Take 1 tablet by mouth Daily.     Routine medications provided by this office will also be refilled via pharmacy request.       Current Outpatient Medications:   •  lisinopril (PRINIVIL,ZESTRIL) 10 MG tablet, Take one and a half tablets every day, Disp: 135 tablet, Rfl: 0  •  amLODIPine (Norvasc) 5 MG tablet, Take 1 tablet by mouth Daily., Disp: 30 tablet, Rfl: 2     Assessment and Plan:      Assessment and Plan {CC Problem List  Visit Diagnosis  ROS  Review (Popup)  Health Maintenance  Quality  BestPractice  Medications  SmartSets  SnapShot Encounters  Media :23}     Problem List Items Addressed This Visit        Cardiac and Vasculature    Essential hypertension (Chronic)    Overview     Prior tx: diuretic (could not tolerate due to being a  / voiding)   Current tx: lisinopril          Current Assessment & Plan     Hypertension is unchanged.  Dietary sodium restriction.  Regular aerobic exercise.  Medication changes per orders.  Blood pressure will be reassessed in 3 months.    Will add norvasc 5 mg daily - can go up to 10 mg daily. "   He will notify me in a few weeks improvement and tolerance.     He is working on decreasing salt intake.          Relevant Medications    amLODIPine (Norvasc) 5 MG tablet    Other Relevant Orders    Basic Metabolic Panel    Lipid Panel With LDL / HDL Ratio       Genitourinary and Reproductive     Elevated PSA (Chronic)    Overview     Lab Results   Component Value Date    PSA 4.790 (H) 05/09/2019    PSA 4.710 (H) 04/09/2018     He was seen by CHASIDY Jay, PSA improved.   Will continue to monitor.          Current Assessment & Plan     No urinary complaints            Other Visit Diagnoses     Encounter to establish care    -  Primary          Follow Up {Instructions Charge Capture  Follow-up Communications :23}     Return in about 3 months (around 8/28/2021) for follow up hypertension .    Patient was given instructions and counseling regarding his condition or for health maintenance advice. Please see specific information pulled into the AVS if appropriate.    Dragon disclaimer:   Much of this encounter note is an electronic transcription/translation of spoken language to printed text. The electronic translation of spoken language may permit erroneous, or at times, nonsensical words or phrases to be inadvertently transcribed; Although I have reviewed the note for such errors, some may still exist.     Additional Patient Counseling:       Patient Instructions   Will add norvasc/ amlodipine 5 mg daily.      Send me a message in a few weeks to let me know how you are doing with medication and blood pressure readings.       It's Nearly Summer Time!    Stay hydrated when outside  If your urine starts to get darker, you are not drinking enough     Protect yourself from ticks and mosquitos  Here are the best ingredients to look for:  · DEET (N,N-diethyl-m-toluamide or N,N-diethyl-3-methyl-benzamide)  · Picaridin  · Oil of lemon eucalyptus (p-menthane-3,8-diol or PMD)  Wear light-colored pants so you can spot ticks  easier  If you use a permethrin product, ONLY apply to clothing    Practice Safe Sun!    · Use sun screen SPF >30 daily, reapply regularly per directions on package  · See dermatologist for skin check regularly  · Protect your eyes with sunglasses with UV protection    Poison Ivy  If you are going into areas that may have poison ivy, prepare with a product like IvyX or other ivy blocker.  Wash your clothes and pets after being in an area with ivy when returning home. If you come in contact with poison ivy, try a product like Technu     Other things you should incorporate all year...     Diet:    • Eat vegetables, fruits, whole grain, low-fat dairy, poultry, fish, beans, nontropical vegetable oils, and nuts, but avoid red meat (i.e., Mediterranean-style diet, DASH [Dietary Approaches to Stop Hypertension] diet).  • Limit sugary drinks and sweets.  • Limit saturated and trans fat to 5% to 6% of calories.  • Limit sodium intake to 2,400 mg daily (about one teaspoon table salt [kosher/sea salt have less sodium per teaspoon]).  Weight loss / Calorie Counting Apps:    • Lose It!   • Hello Health Pal   • Works great when you try it with a partner/ friend. It takes about 15 minutes a day but studies show that this simple method of monitoring your intake can help you achieve goals as it keeps you accountable.  I often will ask patients to try these apps just to get an idea of how much sodium and how many carbohydrates you are taking in.   Exercise:   • Engage in moderate-to-vigorous aerobic activity for at least 40 minutes (on average) three to four times each week.  Wearables:   • Activity tracker   • Step tracker: getting 7,500 steps daily can cut your cardiac risks by 44%   Bone Health:   • Https://www.nof.org/patients/treatment/nutrition/  • Routine weight bearing exercise      If you have not yet had your COVID vaccine, I highly recommend you schedule to have one ASAP.   You are not only protecting your health but you are  protecting a love one that may be more vulnerable than you if they were to contract the virus.

## 2021-05-28 NOTE — ASSESSMENT & PLAN NOTE
Hypertension is unchanged.  Dietary sodium restriction.  Regular aerobic exercise.  Medication changes per orders.  Blood pressure will be reassessed in 3 months.    Will add norvasc 5 mg daily - can go up to 10 mg daily.   He will notify me in a few weeks improvement and tolerance.     He is working on decreasing salt intake.

## 2021-05-29 LAB
BUN SERPL-MCNC: 15 MG/DL (ref 6–20)
BUN/CREAT SERPL: 12 (ref 7–25)
CALCIUM SERPL-MCNC: 9.4 MG/DL (ref 8.6–10.5)
CHLORIDE SERPL-SCNC: 108 MMOL/L (ref 98–107)
CHOLEST SERPL-MCNC: 196 MG/DL (ref 0–200)
CO2 SERPL-SCNC: 27.9 MMOL/L (ref 22–29)
CREAT SERPL-MCNC: 1.25 MG/DL (ref 0.76–1.27)
GLUCOSE SERPL-MCNC: 93 MG/DL (ref 65–99)
HDLC SERPL-MCNC: 47 MG/DL (ref 40–60)
LDLC SERPL CALC-MCNC: 135 MG/DL (ref 0–100)
LDLC/HDLC SERPL: 2.84 {RATIO}
POTASSIUM SERPL-SCNC: 4.6 MMOL/L (ref 3.5–5.2)
SODIUM SERPL-SCNC: 144 MMOL/L (ref 136–145)
TRIGL SERPL-MCNC: 77 MG/DL (ref 0–150)
VLDLC SERPL CALC-MCNC: 14 MG/DL (ref 5–40)

## 2021-08-02 DIAGNOSIS — I10 ESSENTIAL HYPERTENSION: ICD-10-CM

## 2021-08-03 ENCOUNTER — PATIENT MESSAGE (OUTPATIENT)
Dept: INTERNAL MEDICINE | Facility: CLINIC | Age: 56
End: 2021-08-03

## 2021-08-03 RX ORDER — LISINOPRIL 10 MG/1
TABLET ORAL
Qty: 135 TABLET | Refills: 0 | Status: SHIPPED | OUTPATIENT
Start: 2021-08-03 | End: 2021-09-20 | Stop reason: SDUPTHER

## 2021-08-03 NOTE — TELEPHONE ENCOUNTER
From: Aldair Wesley  To: Donald Vides III, NP-C  Sent: 8/3/2021 2:33 PM EDT  Subject: Prescription Question    I received a message for refilling my Lisinopril prescription. I no longer take those meds because you switched me to amLODIPine (Norvasc). It has been working well for me. I don't need a refill at this time.  Thanks,  Khalif Wesley

## 2021-08-19 RX ORDER — AMLODIPINE BESYLATE 5 MG/1
TABLET ORAL
Qty: 90 TABLET | Refills: 0 | Status: SHIPPED | OUTPATIENT
Start: 2021-08-19 | End: 2021-09-22

## 2021-09-20 ENCOUNTER — OFFICE VISIT (OUTPATIENT)
Dept: INTERNAL MEDICINE | Facility: CLINIC | Age: 56
End: 2021-09-20

## 2021-09-20 VITALS
DIASTOLIC BLOOD PRESSURE: 82 MMHG | TEMPERATURE: 98.4 F | BODY MASS INDEX: 29.26 KG/M2 | HEIGHT: 67 IN | OXYGEN SATURATION: 97 % | HEART RATE: 76 BPM | WEIGHT: 186.4 LBS | SYSTOLIC BLOOD PRESSURE: 139 MMHG

## 2021-09-20 DIAGNOSIS — I10 ESSENTIAL HYPERTENSION: ICD-10-CM

## 2021-09-20 PROCEDURE — 99213 OFFICE O/P EST LOW 20 MIN: CPT | Performed by: NURSE PRACTITIONER

## 2021-09-20 RX ORDER — LISINOPRIL 10 MG/1
10 TABLET ORAL DAILY
Qty: 90 TABLET | Refills: 1 | Status: SHIPPED | OUTPATIENT
Start: 2021-09-20 | End: 2022-03-08 | Stop reason: DRUGHIGH

## 2021-09-20 NOTE — ASSESSMENT & PLAN NOTE
Hypertension is unchanged.  Dietary sodium restriction.  Regular aerobic exercise.  Blood pressure will be reassessed at the next regular appointment.    He will now take lisinopril and norvasc.    As long as he tolerates together, will be able to change him to single tab: amlodipine/ benazepril 5/10 mg daily.     He will continue to check BP and will notify me if he has any issues prior to next visit.

## 2021-09-20 NOTE — PROGRESS NOTES
Chief Complaint  Follow-up (3 month follow up ) and Hypertension     Subjective:      History of Present Illness {CC  Problem List  Visit  Diagnosis   Encounters  Notes  Medications  Labs  Result Review Imaging  Media :23}     Aldair Wesley presents to [facility] for:     Hypertension  This is a chronic problem. The current episode started more than 1 year ago. The problem is controlled. Pertinent negatives include no chest pain or peripheral edema. Past treatments include ACE inhibitors. Current antihypertension treatment includes calcium channel blockers.        Follow up visit.  Unfortunately, patient misunderstood and stopped lisinopril at last visit.  No reported edema.         Objective:      Physical Exam  Vitals reviewed.   Constitutional:       Appearance: Normal appearance. He is well-developed.   HENT:      Head:      Comments: Wearing mask due to COVID   Neck:      Thyroid: No thyromegaly.   Cardiovascular:      Rate and Rhythm: Normal rate and regular rhythm.      Pulses: Normal pulses.      Heart sounds: Normal heart sounds.   Pulmonary:      Effort: Pulmonary effort is normal.      Breath sounds: Normal breath sounds.      Comments: E/U   Musculoskeletal:      Right lower leg: No edema.      Left lower leg: No edema.   Skin:     General: Skin is warm and dry.   Neurological:      Mental Status: He is alert and oriented to person, place, and time.   Psychiatric:         Mood and Affect: Mood normal.         Behavior: Behavior normal. Behavior is cooperative.         Thought Content: Thought content normal.         Judgment: Judgment normal.        Result Review  Data Reviewed:{ Labs  Result Review  Imaging  Med Tab  Media :23}   The following data was reviewed by: Donald Vides III, NP-C on 09/20/2021  Lab Results - Last 18 Months   Lab Units 05/28/21  1133   GLUCOSE mg/dL 93   BUN mg/dL 15   CREATININE mg/dL 1.25   EGFR IF NONAFRICN  "AM mL/min/1.73 60*   EGFR IF AFRICN AM mL/min/1.73 72   SODIUM mmol/L 144   POTASSIUM mmol/L 4.6   CHLORIDE mmol/L 108*   CALCIUM mg/dL 9.4   CHOLESTEROL mg/dL 196   TRIGLYCERIDES mg/dL 77   HDL CHOL mg/dL 47   VLDL CHOLESTEROL JODIE mg/dL 14   LDL CHOL mg/dL 135*   LDL/HDL RATIO  2.84          Vital Signs:   /82 Comment: manual left  Pulse 76   Temp 98.4 °F (36.9 °C) (Temporal)   Ht 170.2 cm (67.01\")   Wt 84.6 kg (186 lb 6.4 oz)   SpO2 97%   BMI 29.19 kg/m²         Requested Prescriptions     Signed Prescriptions Disp Refills   • lisinopril (PRINIVIL,ZESTRIL) 10 MG tablet 90 tablet 1     Sig: Take 1 tablet by mouth Daily.     Routine medications provided by this office will also be refilled via pharmacy request.       Current Outpatient Medications:   •  amLODIPine (NORVASC) 5 MG tablet, TAKE 1 TABLET BY MOUTH EVERY DAY, Disp: 90 tablet, Rfl: 0  •  lisinopril (PRINIVIL,ZESTRIL) 10 MG tablet, Take 1 tablet by mouth Daily., Disp: 90 tablet, Rfl: 1     Assessment and Plan:      Assessment and Plan {CC Problem List  Visit Diagnosis  ROS  Review (Popup)  Health Maintenance  Quality  BestPractice  Medications  SmartSets  SnapShot Encounters  Media :23}     Problem List Items Addressed This Visit        Cardiac and Vasculature    Essential hypertension (Chronic)    Overview     Prior tx: diuretic (could not tolerate due to being a  / voiding)   Current tx: lisinopril          Current Assessment & Plan     Hypertension is unchanged.  Dietary sodium restriction.  Regular aerobic exercise.  Blood pressure will be reassessed at the next regular appointment.    He will now take lisinopril and norvasc.    As long as he tolerates together, will be able to change him to single tab: amlodipine/ benazepril 5/10 mg daily.     He will continue to check BP and will notify me if he has any issues prior to next visit.          Relevant Medications    lisinopril (PRINIVIL,ZESTRIL) 10 MG tablet          Follow " Up {Instructions Charge Capture  Follow-up Communications :23}     Return in about 6 months (around 3/20/2022) for Annual physical.    Patient was given instructions and counseling regarding his condition or for health maintenance advice. Please see specific information pulled into the AVS if appropriate.    Dragon disclaimer:   Much of this encounter note is an electronic transcription/translation of spoken language to printed text. The electronic translation of spoken language may permit erroneous, or at times, nonsensical words or phrases to be inadvertently transcribed; Although I have reviewed the note for such errors, some may still exist.     Additional Patient Counseling:       There are no Patient Instructions on file for this visit.

## 2021-09-21 ENCOUNTER — TELEPHONE (OUTPATIENT)
Dept: INTERNAL MEDICINE | Facility: CLINIC | Age: 56
End: 2021-09-21

## 2021-09-21 NOTE — TELEPHONE ENCOUNTER
Caller: Aldair Wesley    Relationship: Self    Medication requested (name and dosage): lisinopril (PRINIVIL,ZESTRIL) 10 MG tablet    Pharmacy where request should be sent: [facility]/pharmacy #[id number] - [place], [place] - [address] AT [facility] - [phone]  - [phone]   [phone]    Additional details provided by patient: PATIENT CALLING STATING HE IS OUT OF THIS MEDICATION [facility] WILL NOT LET HIM PICK THIS UP UNTIL 10/10/21 [facility] IS NEEDING THE APPROVAL TO REFILL PATIENT IS LEAVING OUT OF COUNTRY TOMORROW     Best call back number: [phone]     Does the patient have less than a 3 day supply:  [x] Yes  [] No    Myrna Casas Rep   09/21/21 10:33 EDT

## 2021-09-21 NOTE — TELEPHONE ENCOUNTER
This is what I sent - he should be on 10 mg daily.     If he was previously on 1 1/2 tab - then it was a dose change.       At his visit 3 months ago - he was on lisinopril and I  ADDED norvasc.   However, the patient stated he stopped lisinopril as he thought norvasc was to replace lisinopril.   So, I am not sure why they would not refill his medication.     Thank you ... WCN       Order Reconciliation Actions   E-Prescribing Status     Outpatient Medication Detail     lisinopril (PRINIVIL,ZESTRIL) 10 MG tablet   Sig: Take 1 tablet by mouth Daily.   Sent to pharmacy as: Lisinopril 10 MG Oral Tablet (PRINIVIL,ZESTRIL)   Class: Normal   Route: Oral   E-Prescribing Status: Receipt confirmed by pharmacy (9/20/2021 11:54 AM EDT)        I spoke to pharmacist Lexi and he will get the Lisinopril ready for Mr. Wesley. Mr. Wesley was also informed of this. He verbalized he understood and thank you.

## 2021-09-21 NOTE — TELEPHONE ENCOUNTER
The pharmacist would like to know if there was a dose change on the Lisinopril 10 mg  from 1.5 tablets daily to Lisinopril 10 mg 1 tablet daily.    Please verify if there was an actual dose change, as this has to be confirmed by the pharmacist.    Thanks,    Hernan

## 2021-09-22 RX ORDER — AMLODIPINE BESYLATE 5 MG/1
TABLET ORAL
Qty: 90 TABLET | Refills: 0 | Status: SHIPPED | OUTPATIENT
Start: 2021-09-22 | End: 2022-01-17 | Stop reason: SDUPTHER

## 2022-01-17 RX ORDER — AMLODIPINE BESYLATE 5 MG/1
5 TABLET ORAL DAILY
Qty: 90 TABLET | Refills: 0 | Status: SHIPPED | OUTPATIENT
Start: 2022-01-17 | End: 2022-03-08

## 2022-03-08 ENCOUNTER — OFFICE VISIT (OUTPATIENT)
Dept: INTERNAL MEDICINE | Facility: CLINIC | Age: 57
End: 2022-03-08

## 2022-03-08 VITALS
SYSTOLIC BLOOD PRESSURE: 130 MMHG | BODY MASS INDEX: 28.25 KG/M2 | HEART RATE: 72 BPM | OXYGEN SATURATION: 97 % | DIASTOLIC BLOOD PRESSURE: 78 MMHG | HEIGHT: 67 IN | TEMPERATURE: 97.8 F | WEIGHT: 180 LBS

## 2022-03-08 DIAGNOSIS — Z13.220 ENCOUNTER FOR LIPID SCREENING FOR CARDIOVASCULAR DISEASE: ICD-10-CM

## 2022-03-08 DIAGNOSIS — I10 ESSENTIAL HYPERTENSION: ICD-10-CM

## 2022-03-08 DIAGNOSIS — Z00.00 ANNUAL PHYSICAL EXAM: Primary | ICD-10-CM

## 2022-03-08 DIAGNOSIS — Z12.5 SPECIAL SCREENING, PROSTATE CANCER: ICD-10-CM

## 2022-03-08 DIAGNOSIS — Z13.6 ENCOUNTER FOR LIPID SCREENING FOR CARDIOVASCULAR DISEASE: ICD-10-CM

## 2022-03-08 PROCEDURE — 99396 PREV VISIT EST AGE 40-64: CPT | Performed by: NURSE PRACTITIONER

## 2022-03-08 RX ORDER — AMLODIPINE BESYLATE AND BENAZEPRIL HYDROCHLORIDE 5; 10 MG/1; MG/1
1 CAPSULE ORAL DAILY
Qty: 90 CAPSULE | Refills: 1 | Status: SHIPPED | OUTPATIENT
Start: 2022-03-08 | End: 2022-10-24

## 2022-03-08 NOTE — PROGRESS NOTES
"        Chief Complaint  Annual Exam     Subjective:      History of Present Illness {CC  Problem List  Visit  Diagnosis   Encounters  Notes  Medications  Labs  Result Review Imaging  Media :23}     lAdair Wesley presents to [facility] for PE.     He states he has been doing well.   No new issues.     BP controlled: at home this am 123/85  No edema with norvasc.   No CP, SOA.     Aldair is here for coordination of medical care, to discuss health maintenance, disease prevention as well as to follow up on medical problems.       Patient Care Team:  Donald Vides III, NP-C as PCP - General (Family Medicine)        He states that his activity level is heavy.   His diet is in general, a \"healthy\" diet  .   Feels well with few complaints.     Health and Weight:   Weight trend is   Wt Readings from Last 4 Encounters:   03/08/22 86.3 kg (190 lb 4.8 oz)   09/20/21 84.6 kg (186 lb 6.4 oz)   05/28/21 84.5 kg (186 lb 3.2 oz)   12/28/20 80.7 kg (178 lb)         Blood Pressure:   BP Readings from Last 3 Encounters:   03/08/22 130/78   09/20/21 139/82   05/28/21 140/82        Cholesterol Screen:   Most men start screen at 35, if you have family history or comorbidities it may be screen earlier.     Risk Evaluation:  1. Cardiovascular risk factors: hypertension, male gender.  2. Diabetes risk factors: none.   3. Cancer risk factors: no risk factors for cancer.    Prevention:   Cholesterol test and Glucose test: will be checked today.   Hepatitis  C screen: [] Due  [x] Completed :     Colon Cancer Screen:   He has no change in bowel habits.   Patient's last colonoscopy was 7/2019.   He is advised to repeat in 10 years.  Family history: [x] None    [] Yes:     Genital/ Urinary Health:   · He voids without difficulty.    Testicular cancer Screen:   Testicular self exams recommended once a month.   Advised that any firm testicular nodules to be reported immediately.    Prostate " cancer screening:  PSA was discussed and ordered He has no increased risk of prostate cancer.   Family history: [x] None    [] Yes:     Lab Results   Component Value Date    PSA 4.790 (H) 05/09/2019    PSA 4.710 (H) 04/09/2018       Vaccines Due:    [] Pneumovax    []  [x] Shingrix (shingles: series of 2)   []  [] COVID (series of 2)    []    Discussed shingrix - he will get at local pharmacy when he is not traveling for work.     Last eye exam:  UTD  Always where sunglass when outside with UV protection.     He does see his dentist regularly.    Skin Cancer:   Regular Sunsceen: Advised  Routine self assessment of your skin, report any changes.     : [facility] route: [facility]   [facility]       I have reviewed patient's medical history, any new submitted information provided by patient or medical assistant and updated medical record.      Objective:      Physical Exam  Vitals reviewed.   Constitutional:       Appearance: Normal appearance. He is well-developed.   HENT:      Head: Normocephalic and atraumatic.      Right Ear: External ear normal.      Left Ear: External ear normal.      Nose: Nose normal.   Eyes:      Conjunctiva/sclera: Conjunctivae normal.      Pupils: Pupils are equal, round, and reactive to light.   Neck:      Thyroid: No thyromegaly.      Vascular: No JVD.   Cardiovascular:      Rate and Rhythm: Normal rate and regular rhythm.      Pulses: Normal pulses.           Radial pulses are 2+ on the right side and 2+ on the left side.      Heart sounds: Normal heart sounds, S1 normal and S2 normal. No murmur heard.    No friction rub. No gallop.   Pulmonary:      Effort: Pulmonary effort is normal.      Breath sounds: Normal breath sounds.   Chest:      Chest wall: No deformity.   Abdominal:      General: Bowel sounds are normal.      Palpations: Abdomen is soft.      Tenderness: There is no abdominal tenderness. Negative signs include Garza's sign.      Hernia:  "No hernia is present. There is no hernia in the left inguinal area or right inguinal area.   Genitourinary:     Penis: Normal and circumcised.       Testes: Normal. Cremasteric reflex is present.   Musculoskeletal:         General: Normal range of motion.      Cervical back: Normal range of motion and neck supple.      Right lower leg: No edema.   Lymphadenopathy:      Cervical: No cervical adenopathy.   Skin:     General: Skin is warm and dry.      Capillary Refill: Capillary refill takes 2 to 3 seconds.      Nails: There is no clubbing.   Neurological:      General: No focal deficit present.      Mental Status: He is alert and oriented to person, place, and time.      Sensory: No sensory deficit.      Motor: Motor function is intact. No weakness.   Psychiatric:         Mood and Affect: Mood normal.         Speech: Speech normal.         Behavior: Behavior normal. Behavior is cooperative.         Thought Content: Thought content normal.         Judgment: Judgment normal.        Result Review  Data Reviewed:{ Labs  Result Review  Imaging  Med Tab  Media :23}     The following data was reviewed by: Donald Vides III, NP-C on 03/08/2022  Common labs    Common Labsle 5/28/21 5/28/21    1133 1133   Glucose 93    BUN 15    Creatinine 1.25    eGFR Non  Am 60 (A)    eGFR African Am 72    Sodium 144    Potassium 4.6    Chloride 108 (A)    Calcium 9.4    Total Cholesterol  196   Triglycerides  77   HDL Cholesterol  47   LDL Cholesterol   135 (A)   (A) Abnormal value       Comments are available for some flowsheets but are not being displayed.                  Vital Signs:   /78 Comment: manual left  Pulse 72   Temp 97.8 °F (36.6 °C) (Temporal)   Ht 170.2 cm (67\")   Wt 86.3 kg (190 lb 4.8 oz)   SpO2 97%   BMI 29.81 kg/m²         Requested Prescriptions     Signed Prescriptions Disp Refills   • amLODIPine-benazepril (Lotrel) 5-10 MG per capsule 90 capsule 1     Sig: Take 1 capsule by mouth " Daily.       Routine medications provided by this office will also be refilled via pharmacy request.       Current Outpatient Medications:   •  amLODIPine-benazepril (Lotrel) 5-10 MG per capsule, Take 1 capsule by mouth Daily., Disp: 90 capsule, Rfl: 1     Assessment and Plan:      Assessment and Plan {CC Problem List  Visit Diagnosis  ROS  Review (Popup)  Health Maintenance  Quality  BestPractice  Medications  SmartSets  SnapShot Encounters  Media :23}     Problem List Items Addressed This Visit        Cardiac and Vasculature    Essential hypertension (Chronic)    Overview     Prior tx: diuretic (could not tolerate due to being a  / voiding)   Current tx: lisinopril            Current Assessment & Plan     Hypertension is improving with treatment.  Dietary sodium restriction.  Regular aerobic exercise.  Medication changes per orders.  Blood pressure will be reassessed at the next regular appointment.    Changed to Lotrel as he has been stable on current doses for convenience.            Relevant Medications    amLODIPine-benazepril (Lotrel) 5-10 MG per capsule    Other Relevant Orders    Comprehensive Metabolic Panel    CBC (No Diff)      Other Visit Diagnoses     Annual physical exam    -  Primary    Relevant Orders    Comprehensive Metabolic Panel    CBC (No Diff)    Special screening, prostate cancer        Relevant Orders    PSA SCREENING    Encounter for lipid screening for cardiovascular disease        Relevant Orders    Comprehensive Metabolic Panel    Lipid Panel With LDL / HDL Ratio    CBC (No Diff)          Follow Up {Instructions Charge Capture  Follow-up Communications :23}     Return in about 6 months (around 9/8/2022).    Patient was given instructions and counseling regarding his condition or for health maintenance advice. Please see specific information pulled into the AVS if appropriate.    Marely disclaimer:   Much of this encounter note is an electronic  transcription/translation of spoken language to printed text. The electronic translation of spoken language may permit erroneous, or at times, nonsensical words or phrases to be inadvertently transcribed; Although I have reviewed the note for such errors, some may still exist.     Additional Patient Counseling:       Patient Instructions   Diet:    • Eat vegetables, fruits, whole grain, low-fat dairy, poultry, fish, beans, nontropical vegetable oils, and nuts, but avoid red meat (i.e., Mediterranean-style diet, DASH [Dietary Approaches to Stop Hypertension] diet).  • Limit sugary drinks and sweets.  • Limit saturated and trans fat to 5% to 6% of calories.  • Limit sodium intake to 2,400 mg daily (about one teaspoon table salt [kosher/sea salt have less sodium per teaspoon]).  Weight loss / Calorie Counting Apps:    • Lose It!   • [employer] Pal   • Works great when you try it with a partner/ friend  Exercise:   • Engage in moderate-to-vigorous aerobic activity for at least 40 minutes (on average) three to four times each week.  Wearables:   • Activity tracker   • Step tracker   Skin Care:   • Use sun screen SPF >30 daily  • Dermatologist for skin check regularly  Bone Health:   • [email or web address]/patients/treatment/[email or web address]    CDC recommends Flu vaccines for everyone 6 months and older every season with rare exceptions.       Vaccines:     Shingles vaccine is given in TWO separate injections.   CDC recommends that healthy adults 50 years and older get two doses of the shingles vaccine called Shingrix (recombinant zoster vaccine),  by 2 to 6 months, to prevent shingles and the complications from the disease.

## 2022-03-08 NOTE — ASSESSMENT & PLAN NOTE
Hypertension is improving with treatment.  Dietary sodium restriction.  Regular aerobic exercise.  Medication changes per orders.  Blood pressure will be reassessed at the next regular appointment.    Changed to Lotrel as he has been stable on current doses for convenience.

## 2022-03-08 NOTE — PATIENT INSTRUCTIONS
Diet:    Eat vegetables, fruits, whole grain, low-fat dairy, poultry, fish, beans, nontropical vegetable oils, and nuts, but avoid red meat (i.e., Mediterranean-style diet, DASH [Dietary Approaches to Stop Hypertension] diet).  Limit sugary drinks and sweets.  Limit saturated and trans fat to 5% to 6% of calories.  Limit sodium intake to 2,400 mg daily (about one teaspoon table salt [kosher/sea salt have less sodium per teaspoon]).  Weight loss / Calorie Counting Apps:    Lose It!   [employer] Pal   Works great when you try it with a partner/ friend  Exercise:   Engage in moderate-to-vigorous aerobic activity for at least 40 minutes (on average) three to four times each week.  Wearables:   Activity tracker   Step tracker   Skin Care:   Use sun screen SPF >30 daily  Dermatologist for skin check regularly  Bone Health:   [email or web address]/patients/treatment/[email or web address]    CDC recommends Flu vaccines for everyone 6 months and older every season with rare exceptions.       Vaccines:     Shingles vaccine is given in TWO separate injections.   CDC recommends that healthy adults 50 years and older get two doses of the shingles vaccine called Shingrix (recombinant zoster vaccine),  by 2 to 6 months, to prevent shingles and the complications from the disease.

## 2022-03-09 LAB
ALBUMIN SERPL-MCNC: 4.3 G/DL (ref 3.8–4.9)
ALBUMIN/GLOB SERPL: 1.4 {RATIO} (ref 1.2–2.2)
ALP SERPL-CCNC: 77 IU/L (ref 44–121)
ALT SERPL-CCNC: 15 IU/L (ref 0–44)
AST SERPL-CCNC: 18 IU/L (ref 0–40)
BILIRUB SERPL-MCNC: 0.6 MG/DL (ref 0–1.2)
BUN SERPL-MCNC: 12 MG/DL (ref 6–24)
BUN/CREAT SERPL: 10 (ref 9–20)
CALCIUM SERPL-MCNC: 9.5 MG/DL (ref 8.7–10.2)
CHLORIDE SERPL-SCNC: 104 MMOL/L (ref 96–106)
CHOLEST SERPL-MCNC: 196 MG/DL (ref 100–199)
CO2 SERPL-SCNC: 25 MMOL/L (ref 20–29)
CREAT SERPL-MCNC: 1.23 MG/DL (ref 0.76–1.27)
EGFR GENE MUT ANL BLD/T: 68 ML/MIN/1.73
ERYTHROCYTE [DISTWIDTH] IN BLOOD BY AUTOMATED COUNT: 13.9 % (ref 11.6–15.4)
GLOBULIN SER CALC-MCNC: 3 G/DL (ref 1.5–4.5)
GLUCOSE SERPL-MCNC: 95 MG/DL (ref 65–99)
HCT VFR BLD AUTO: 47 % (ref 37.5–51)
HDLC SERPL-MCNC: 53 MG/DL
HGB BLD-MCNC: 16.1 G/DL (ref 13–17.7)
LDLC SERPL CALC-MCNC: 132 MG/DL (ref 0–99)
LDLC/HDLC SERPL: 2.5 RATIO (ref 0–3.6)
MCH RBC QN AUTO: 30.3 PG (ref 26.6–33)
MCHC RBC AUTO-ENTMCNC: 34.3 G/DL (ref 31.5–35.7)
MCV RBC AUTO: 88 FL (ref 79–97)
PLATELET # BLD AUTO: 238 X10E3/UL (ref 150–450)
POTASSIUM SERPL-SCNC: 4.2 MMOL/L (ref 3.5–5.2)
PROT SERPL-MCNC: 7.3 G/DL (ref 6–8.5)
PSA SERPL-MCNC: 6.8 NG/ML (ref 0–4)
RBC # BLD AUTO: 5.32 X10E6/UL (ref 4.14–5.8)
SODIUM SERPL-SCNC: 142 MMOL/L (ref 134–144)
TRIGL SERPL-MCNC: 59 MG/DL (ref 0–149)
VLDLC SERPL CALC-MCNC: 11 MG/DL (ref 5–40)
WBC # BLD AUTO: 5.8 X10E3/UL (ref 3.4–10.8)

## 2022-03-16 ENCOUNTER — TELEPHONE (OUTPATIENT)
Dept: INTERNAL MEDICINE | Facility: CLINIC | Age: 57
End: 2022-03-16

## 2022-03-16 NOTE — TELEPHONE ENCOUNTER
PATIENT CALLED TO F/U ON UROLOGY REFERRAL, WAS TOLD BY NURSE HIS PSA LEVELS WERE HIGH,  HE REQUESTED REFERRAL TO BE SENT TO THE SAME UROLOGIST JOSH MONTGOMERY REFERRED HIM TO A COUPLE YEARS AGO

## 2022-04-13 DIAGNOSIS — I10 ESSENTIAL HYPERTENSION: ICD-10-CM

## 2022-04-13 RX ORDER — LISINOPRIL 10 MG/1
TABLET ORAL
Qty: 90 TABLET | Refills: 1 | OUTPATIENT
Start: 2022-04-13

## 2022-09-12 ENCOUNTER — OFFICE VISIT (OUTPATIENT)
Dept: INTERNAL MEDICINE | Facility: CLINIC | Age: 57
End: 2022-09-12

## 2022-09-12 VITALS
DIASTOLIC BLOOD PRESSURE: 74 MMHG | BODY MASS INDEX: 28.24 KG/M2 | TEMPERATURE: 97.8 F | WEIGHT: 179.9 LBS | SYSTOLIC BLOOD PRESSURE: 128 MMHG | HEIGHT: 67 IN | HEART RATE: 64 BPM | OXYGEN SATURATION: 98 %

## 2022-09-12 DIAGNOSIS — R97.20 ELEVATED PSA: Chronic | ICD-10-CM

## 2022-09-12 DIAGNOSIS — N17.9 AKI (ACUTE KIDNEY INJURY): ICD-10-CM

## 2022-09-12 DIAGNOSIS — I10 ESSENTIAL HYPERTENSION: Primary | Chronic | ICD-10-CM

## 2022-09-12 LAB
BUN SERPL-MCNC: 12 MG/DL (ref 6–20)
BUN/CREAT SERPL: 9.7 (ref 7–25)
CALCIUM SERPL-MCNC: 9.3 MG/DL (ref 8.6–10.5)
CHLORIDE SERPL-SCNC: 106 MMOL/L (ref 98–107)
CO2 SERPL-SCNC: 28 MMOL/L (ref 22–29)
CREAT SERPL-MCNC: 1.24 MG/DL (ref 0.76–1.27)
EGFRCR-CYS SERPLBLD CKD-EPI 2021: 67.8 ML/MIN/1.73
GLUCOSE SERPL-MCNC: 94 MG/DL (ref 65–99)
POTASSIUM SERPL-SCNC: 4 MMOL/L (ref 3.5–5.2)
SODIUM SERPL-SCNC: 142 MMOL/L (ref 136–145)

## 2022-09-12 PROCEDURE — 99214 OFFICE O/P EST MOD 30 MIN: CPT | Performed by: NURSE PRACTITIONER

## 2022-09-12 NOTE — PROGRESS NOTES
"        Chief Complaint  Hypertension (6 month follow up )     Subjective:      History of Present Illness {CC  Problem List  Visit  Diagnosis   Encounters  Notes  Medications  Labs  Result Review Imaging  Media :23}     Aldair Wesley presents to [facility] for:      Hypertension: chronic - continues lotrel   [facility] and [facility] 1.52  Last here was 1.23 - will recheck today.       Since last visit: follow up with Dr CHASIDY Jay: urology: PSA was up to 6.8 at last visit here.  He was treated for prostatitis and will follow up 6 months (in Jan)  from .  He states recheck down to 4.7      Still doing long haul flights - on vacation now.   Advised to get updated COVID booster.     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   No injury.   Has full rom.         I have reviewed patient's medical history, any new submitted information provided by patient or medical assistant and updated medical record.      Objective:      Physical Exam  Constitutional:       Appearance: Normal appearance.   Cardiovascular:      Rate and Rhythm: Normal rate.      Pulses: Normal pulses.   Pulmonary:      Effort: Pulmonary effort is normal.   Musculoskeletal:      Right shoulder: Tenderness (anterior bursa ) present. Normal range of motion.   Neurological:      Mental Status: He is alert and oriented to person, place, and time.        Result Review  Data Reviewed:{ Labs  Result Review  Imaging  Med Tab  Media :23}                Vital Signs:   /74 (BP Location: Left arm, Patient Position: Sitting, Cuff Size: Adult)   Pulse 64   Temp 97.8 °F (36.6 °C) (Temporal)   Ht 170.2 cm (67\")   Wt 81.6 kg (179 lb 14.4 oz)   SpO2 98%   BMI 28.18 kg/m²         Requested Prescriptions      No prescriptions requested or ordered in this encounter       Routine medications provided by this office will also be refilled via pharmacy request.       Current Outpatient " Medications:   •  amLODIPine-benazepril (Lotrel) 5-10 MG per capsule, Take 1 capsule by mouth Daily., Disp: 90 capsule, Rfl: 1     Assessment and Plan:      Assessment and Plan {CC Problem List  Visit Diagnosis  ROS  Review (Popup)  Health Maintenance  Quality  BestPractice  Medications  SmartSets  SnapShot Encounters  Media :23}     Problem List Items Addressed This Visit        Cardiac and Vasculature    Essential hypertension - Primary (Chronic)    Overview     Prior tx: diuretic (could not tolerate due to being a  / voiding)   Current tx: lisinopril          Current Assessment & Plan     Hypertension is improving with treatment.  Continue current treatment regimen.  Dietary sodium restriction.  Regular aerobic exercise.  Continue current medications.  Blood pressure will be reassessed at the next regular appointment.         Relevant Orders    Basic Metabolic Panel       Genitourinary and Reproductive     Elevated PSA (Chronic)    Overview     Lab Results   Component Value Date    PSA 6.8 (H) 03/08/2022    PSA 4.790 (H) 05/09/2019    PSA 4.710 (H) 04/09/2018     He was seen by CHASIDY Jay, PSA improved.   Will continue to monitor.            Other Visit Diagnoses     DALE (acute kidney injury) (HCC)        Relevant Orders    Basic Metabolic Panel        Will recheck labs today - if cr elevated.  Stop lisinopril.   R shoulder - likely bursitis.  Declines further workup at this time - if persists, refer to ortho.     Follow Up {Instructions Charge Capture  Follow-up Communications :23}     Return in about 6 months (around 3/12/2023) for Annual physical.      Patient was given instructions and counseling regarding his condition or for health maintenance advice. Please see specific information pulled into the AVS if appropriate.    Yeseniaon disclaimer:   Much of this encounter note is an electronic transcription/translation of spoken language to printed text. The electronic translation of spoken  language may permit erroneous, or at times, nonsensical words or phrases to be inadvertently transcribed; Although I have reviewed the note for such errors, some may still exist.     Additional Patient Counseling:       There are no Patient Instructions on file for this visit.

## 2022-10-24 RX ORDER — AMLODIPINE BESYLATE AND BENAZEPRIL HYDROCHLORIDE 5; 10 MG/1; MG/1
CAPSULE ORAL
Qty: 90 CAPSULE | Refills: 1 | Status: SHIPPED | OUTPATIENT
Start: 2022-10-24

## 2023-04-24 RX ORDER — AMLODIPINE BESYLATE AND BENAZEPRIL HYDROCHLORIDE 5; 10 MG/1; MG/1
CAPSULE ORAL
Qty: 90 CAPSULE | Refills: 1 | OUTPATIENT
Start: 2023-04-24

## 2023-05-24 DIAGNOSIS — I10 ESSENTIAL HYPERTENSION: Chronic | ICD-10-CM

## 2023-05-24 DIAGNOSIS — I10 ESSENTIAL HYPERTENSION: Primary | Chronic | ICD-10-CM

## 2023-05-24 RX ORDER — AMLODIPINE BESYLATE AND BENAZEPRIL HYDROCHLORIDE 5; 10 MG/1; MG/1
CAPSULE ORAL
Qty: 90 CAPSULE | Refills: 1 | OUTPATIENT
Start: 2023-05-24

## 2023-05-24 RX ORDER — AMLODIPINE BESYLATE AND BENAZEPRIL HYDROCHLORIDE 5; 10 MG/1; MG/1
1 CAPSULE ORAL DAILY
Qty: 30 CAPSULE | Refills: 0 | Status: SHIPPED | OUTPATIENT
Start: 2023-05-24

## 2023-05-24 NOTE — TELEPHONE ENCOUNTER
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 After speaking with Fermin he approved his request.

## 2025-02-21 ENCOUNTER — TRANSCRIBE ORDERS (OUTPATIENT)
Dept: ADMINISTRATIVE | Facility: HOSPITAL | Age: 60
End: 2025-02-21
Payer: COMMERCIAL

## 2025-02-21 DIAGNOSIS — R97.20 ELEVATED PSA: Primary | ICD-10-CM

## 2025-03-02 ENCOUNTER — HOSPITAL ENCOUNTER (OUTPATIENT)
Facility: HOSPITAL | Age: 60
Discharge: HOME | End: 2025-03-02
Payer: COMMERCIAL

## 2025-03-02 DIAGNOSIS — R97.20 ELEVATED PSA: ICD-10-CM

## 2025-03-02 PROCEDURE — 72197 MRI PELVIS W/O & W/DYE: CPT

## 2025-03-02 PROCEDURE — A9577 INJ MULTIHANCE: HCPCS

## 2025-03-02 PROCEDURE — 25510000002 GADOBENATE DIMEGLUMINE 529 MG/ML SOLUTION

## 2025-03-02 RX ADMIN — GADOBENATE DIMEGLUMINE 16 ML: 529 INJECTION, SOLUTION INTRAVENOUS at 09:37

## (undated) DEVICE — CANNULA,ADULT,SOFT-TOUCH,7'TUBE,UC: Brand: PENDING

## (undated) DEVICE — Device: Brand: DEFENDO AIR/WATER/SUCTION AND BIOPSY VALVE

## (undated) DEVICE — SENSR O2 OXIMAX FNGR A/ 18IN NONSTR

## (undated) DEVICE — THE TORRENT IRRIGATION SCOPE CONNECTOR IS USED WITH THE TORRENT IRRIGATION TUBING TO PROVIDE IRRIGATION FLUIDS SUCH AS STERILE WATER DURING GASTROINTESTINAL ENDOSCOPIC PROCEDURES WHEN USED IN CONJUNCTION WITH AN IRRIGATION PUMP (OR ELECTROSURGICAL UNIT).: Brand: TORRENT

## (undated) DEVICE — SINGLE-USE BIOPSY FORCEPS: Brand: RADIAL JAW 4

## (undated) DEVICE — TUBING, SUCTION, 1/4" X 10', STRAIGHT: Brand: MEDLINE